# Patient Record
Sex: FEMALE | Race: WHITE | NOT HISPANIC OR LATINO | Employment: OTHER | URBAN - NONMETROPOLITAN AREA
[De-identification: names, ages, dates, MRNs, and addresses within clinical notes are randomized per-mention and may not be internally consistent; named-entity substitution may affect disease eponyms.]

---

## 2017-02-24 ENCOUNTER — TRANSCRIBE ORDERS (OUTPATIENT)
Dept: LAB | Facility: MEDICAL CENTER | Age: 57
End: 2017-02-24

## 2017-02-24 ENCOUNTER — APPOINTMENT (OUTPATIENT)
Dept: LAB | Facility: MEDICAL CENTER | Age: 57
End: 2017-02-24
Payer: COMMERCIAL

## 2017-02-24 DIAGNOSIS — K75.81 NONALCOHOLIC STEATOHEPATITIS: ICD-10-CM

## 2017-02-24 DIAGNOSIS — K75.81 NONALCOHOLIC STEATOHEPATITIS: Primary | ICD-10-CM

## 2017-02-24 LAB
ALBUMIN SERPL BCP-MCNC: 3.6 G/DL (ref 3.5–5)
ALP SERPL-CCNC: 93 U/L (ref 46–116)
ALT SERPL W P-5'-P-CCNC: 100 U/L (ref 12–78)
ANION GAP SERPL CALCULATED.3IONS-SCNC: 6 MMOL/L (ref 4–13)
AST SERPL W P-5'-P-CCNC: 39 U/L (ref 5–45)
BASOPHILS # BLD AUTO: 0.02 THOUSANDS/ΜL (ref 0–0.1)
BASOPHILS NFR BLD AUTO: 0 % (ref 0–1)
BILIRUB SERPL-MCNC: 0.65 MG/DL (ref 0.2–1)
BUN SERPL-MCNC: 22 MG/DL (ref 5–25)
CALCIUM SERPL-MCNC: 9.5 MG/DL (ref 8.3–10.1)
CHLORIDE SERPL-SCNC: 106 MMOL/L (ref 100–108)
CO2 SERPL-SCNC: 28 MMOL/L (ref 21–32)
CREAT SERPL-MCNC: 0.83 MG/DL (ref 0.6–1.3)
EOSINOPHIL # BLD AUTO: 0.21 THOUSAND/ΜL (ref 0–0.61)
EOSINOPHIL NFR BLD AUTO: 3 % (ref 0–6)
ERYTHROCYTE [DISTWIDTH] IN BLOOD BY AUTOMATED COUNT: 13.6 % (ref 11.6–15.1)
GFR SERPL CREATININE-BSD FRML MDRD: >60 ML/MIN/1.73SQ M
GLUCOSE SERPL-MCNC: 85 MG/DL (ref 65–140)
HCT VFR BLD AUTO: 42.7 % (ref 34.8–46.1)
HGB BLD-MCNC: 13.6 G/DL (ref 11.5–15.4)
LYMPHOCYTES # BLD AUTO: 2.99 THOUSANDS/ΜL (ref 0.6–4.47)
LYMPHOCYTES NFR BLD AUTO: 36 % (ref 14–44)
MCH RBC QN AUTO: 29.2 PG (ref 26.8–34.3)
MCHC RBC AUTO-ENTMCNC: 31.9 G/DL (ref 31.4–37.4)
MCV RBC AUTO: 92 FL (ref 82–98)
MONOCYTES # BLD AUTO: 0.57 THOUSAND/ΜL (ref 0.17–1.22)
MONOCYTES NFR BLD AUTO: 7 % (ref 4–12)
NEUTROPHILS # BLD AUTO: 4.44 THOUSANDS/ΜL (ref 1.85–7.62)
NEUTS SEG NFR BLD AUTO: 54 % (ref 43–75)
NRBC BLD AUTO-RTO: 0 /100 WBCS
PLATELET # BLD AUTO: 286 THOUSANDS/UL (ref 149–390)
PMV BLD AUTO: 11.6 FL (ref 8.9–12.7)
POTASSIUM SERPL-SCNC: 4.1 MMOL/L (ref 3.5–5.3)
PROT SERPL-MCNC: 8 G/DL (ref 6.4–8.2)
RBC # BLD AUTO: 4.65 MILLION/UL (ref 3.81–5.12)
SODIUM SERPL-SCNC: 140 MMOL/L (ref 136–145)
WBC # BLD AUTO: 8.27 THOUSAND/UL (ref 4.31–10.16)

## 2017-02-24 PROCEDURE — 80053 COMPREHEN METABOLIC PANEL: CPT

## 2017-02-24 PROCEDURE — 36415 COLL VENOUS BLD VENIPUNCTURE: CPT

## 2017-02-24 PROCEDURE — 85025 COMPLETE CBC W/AUTO DIFF WBC: CPT

## 2017-07-20 ENCOUNTER — HOSPITAL ENCOUNTER (OUTPATIENT)
Dept: SLEEP CENTER | Facility: HOSPITAL | Age: 57
Discharge: HOME | End: 2017-07-20
Payer: COMMERCIAL

## 2017-07-24 ENCOUNTER — TRANSCRIBE ORDERS (OUTPATIENT)
Dept: SLEEP CENTER | Facility: HOSPITAL | Age: 57
End: 2017-07-24

## 2017-07-24 DIAGNOSIS — G47.33 OBSTRUCTIVE SLEEP APNEA (ADULT) (PEDIATRIC): Primary | ICD-10-CM

## 2018-06-12 RX ORDER — GABAPENTIN 400 MG/1
CAPSULE ORAL
Qty: 90 CAPSULE | Refills: 0 | OUTPATIENT
Start: 2018-06-12

## 2018-07-17 RX ORDER — VALSARTAN 320 MG/1
TABLET ORAL
COMMUNITY
Start: 2018-05-24

## 2018-07-17 RX ORDER — LEVOTHYROXINE SODIUM 0.1 MG/1
100 TABLET ORAL
COMMUNITY

## 2018-07-17 RX ORDER — ASPIRIN 81 MG/1
162 TABLET, CHEWABLE ORAL
COMMUNITY

## 2018-07-17 RX ORDER — PANTOPRAZOLE SODIUM 20 MG/1
20 TABLET, DELAYED RELEASE ORAL
COMMUNITY

## 2018-07-17 RX ORDER — GABAPENTIN 400 MG/1
400 CAPSULE ORAL
COMMUNITY
End: 2018-07-19

## 2018-07-17 RX ORDER — DULOXETIN HYDROCHLORIDE 30 MG/1
30 CAPSULE, DELAYED RELEASE ORAL
COMMUNITY
End: 2018-07-19 | Stop reason: ALTCHOICE

## 2018-07-17 RX ORDER — RALOXIFENE HYDROCHLORIDE 60 MG/1
60 TABLET, FILM COATED ORAL
COMMUNITY

## 2018-07-17 RX ORDER — LEVOTHYROXINE SODIUM 100 MCG
TABLET ORAL
COMMUNITY
Start: 2018-05-24 | End: 2018-07-17

## 2018-07-19 ENCOUNTER — OFFICE VISIT (OUTPATIENT)
Dept: SLEEP CENTER | Facility: CLINIC | Age: 58
End: 2018-07-19
Payer: COMMERCIAL

## 2018-07-19 VITALS
SYSTOLIC BLOOD PRESSURE: 124 MMHG | DIASTOLIC BLOOD PRESSURE: 84 MMHG | HEIGHT: 64 IN | BODY MASS INDEX: 38.41 KG/M2 | RESPIRATION RATE: 16 BRPM | HEART RATE: 87 BPM | WEIGHT: 225 LBS

## 2018-07-19 DIAGNOSIS — G25.81 RLS (RESTLESS LEGS SYNDROME): Primary | ICD-10-CM

## 2018-07-19 DIAGNOSIS — M79.7 FIBROMYALGIA SYNDROME: ICD-10-CM

## 2018-07-19 DIAGNOSIS — E66.9 OBESITY (BMI 30-39.9): ICD-10-CM

## 2018-07-19 DIAGNOSIS — G47.61 PLMD (PERIODIC LIMB MOVEMENT DISORDER): ICD-10-CM

## 2018-07-19 DIAGNOSIS — R06.83 SNORING: ICD-10-CM

## 2018-07-19 DIAGNOSIS — G47.00 INSOMNIA, UNSPECIFIED TYPE: ICD-10-CM

## 2018-07-19 PROCEDURE — 99214 OFFICE O/P EST MOD 30 MIN: CPT | Performed by: INTERNAL MEDICINE

## 2018-07-19 NOTE — PROGRESS NOTES
Follow-Up Note - [address] [place]  60 y o  female  TLO:5/[birth year]  CHARITO:[id number]    CC: I saw this patient for follow-up in clinic today for restless leg syndrome, Coexisting Sleep and Medical Problems  PFSH, Problem List, Medications & Allergies were reviewed in EMR  Interval changes: [none reported ]   She  has no past medical history on file  She has a current medication list which includes the following prescription(s): aspirin, calcium-vitamin d, cholecalciferol, levothyroxine, multiple vitamins-minerals, omega-3 fatty acids, pantoprazole, raloxifene, and valsartan  ROS: reviewed (see attached)  Ronnypriya Levinetong HPI:  She ran out of her medication and stopped taking it around 3 weeks ago  Since then, she feels she is doing much better off the medication  She continues to have restless leg symptoms but only infrequently and not disturbing sleep  She is not aware of jerking movements during sleep  She sleeps alone but is not aware of snoring or breathing difficulties during sleep  She was taking Cymbalta for fibromyalgia but discontinued use because symptoms have improved  Sleep Routine: She reports getting 8 hours sleep; she has no difficulty initiating or maintaining sleep   She awakens spontaneously feeling refreshed  She denies excessive drowsiness  [She rated herself at Total score: 7 /24 on the [place] sleepiness scale ]    Habits:[ has no tobacco history on file ], [ has no alcohol history on file ], [ has no drug history on file ], Caffeine use: limited , Exercise routine: none  EXAM: /84 (BP Location: Left arm, Patient Position: Sitting, Cuff Size: Large)   Pulse 87   Resp 16   Ht 5' 4" (1 626 m)   Wt 102 kg (225 lb)   BMI 38 62 kg/m²    Patient is alert, orientated, cooperative [and in no distress]  Mental state [appears normal]  Craniofacial anatomy obvious overjet  There are [no] facial pressure marks or rashes  There are no abnormal neck masses   Nasal airway is [patent ]  Mucous membranes appeared normal  The oral airway [is crowded ] Base of tongue is at Mallampati class IV (only hard palate visible)  [Apart from truncal obesity,] the rest of exam (Heart, Lungs, Abdomen, CNS and Musculoskeletal systems) was unremarkable   IMPRESSION:     1  RLS (restless legs syndrome)  Sleep F/U  - established patient   2  PLMD (periodic limb movement disorder)     3  Snoring     4  Insomnia, unspecified type      Resolved   5  Fibromyalgia syndrome      Improved   6  Obesity (BMI 30-39  9)         PLAN:  1  I reviewed results of diagnostic study in 2015 with her  2  I discussed treatment options with risks and benefits  3  She elected no medication for now  4  I advised on nonpharmacological strategies for restless legs syndrome  5  I advised on sleep hygiene, regular exercise and weight reduction  6  I have not scheduled any follow-up in Sleep Clinic at this time  I will be happy to see her in future as needed  Thank you for allowing me to participate in the care of this patient            Sincerely,    Authenticated electronically by Trena Freeman MD on 62/92/78   Board Certified Specialist

## 2018-07-19 NOTE — PROGRESS NOTES
Review of Systems      Genitourinary post menopausal (no peroids)   Cardiology none   Gastrointestinal none   Neurology none   Constitutional none   Integumentary none   Psychiatry none   Musculoskeletal joint pain, back pain and legs twitching/jerking   Pulmonary none   ENT throat clearing   Endocrine none   Hematological none

## 2019-06-05 ENCOUNTER — APPOINTMENT (OUTPATIENT)
Dept: RADIOLOGY | Facility: CLINIC | Age: 59
End: 2019-06-05
Payer: COMMERCIAL

## 2019-06-05 ENCOUNTER — TRANSCRIBE ORDERS (OUTPATIENT)
Dept: URGENT CARE | Facility: CLINIC | Age: 59
End: 2019-06-05

## 2019-06-05 DIAGNOSIS — M25.562 LEFT KNEE PAIN, UNSPECIFIED CHRONICITY: Primary | ICD-10-CM

## 2019-06-05 PROCEDURE — 73562 X-RAY EXAM OF KNEE 3: CPT

## 2021-01-13 DIAGNOSIS — R06.00 DYSPNEA ON EXERTION: ICD-10-CM

## 2021-01-13 DIAGNOSIS — Z20.828 EXPOSURE TO SARS-ASSOCIATED CORONAVIRUS: ICD-10-CM

## 2021-01-13 DIAGNOSIS — R68.83 CHILLS: ICD-10-CM

## 2021-01-13 DIAGNOSIS — R05.9 COUGH: ICD-10-CM

## 2021-01-13 PROCEDURE — U0005 INFEC AGEN DETEC AMPLI PROBE: HCPCS

## 2021-01-13 PROCEDURE — U0003 INFECTIOUS AGENT DETECTION BY NUCLEIC ACID (DNA OR RNA); SEVERE ACUTE RESPIRATORY SYNDROME CORONAVIRUS 2 (SARS-COV-2) (CORONAVIRUS DISEASE [COVID-19]), AMPLIFIED PROBE TECHNIQUE, MAKING USE OF HIGH THROUGHPUT TECHNOLOGIES AS DESCRIBED BY CMS-2020-01-R: HCPCS

## 2021-01-14 LAB — SARS-COV-2 RNA RESP QL NAA+PROBE: POSITIVE

## 2021-11-10 PROCEDURE — U0003 INFECTIOUS AGENT DETECTION BY NUCLEIC ACID (DNA OR RNA); SEVERE ACUTE RESPIRATORY SYNDROME CORONAVIRUS 2 (SARS-COV-2) (CORONAVIRUS DISEASE [COVID-19]), AMPLIFIED PROBE TECHNIQUE, MAKING USE OF HIGH THROUGHPUT TECHNOLOGIES AS DESCRIBED BY CMS-2020-01-R: HCPCS | Performed by: NURSE PRACTITIONER

## 2021-11-10 PROCEDURE — U0005 INFEC AGEN DETEC AMPLI PROBE: HCPCS | Performed by: NURSE PRACTITIONER

## 2022-02-24 ENCOUNTER — OFFICE VISIT (OUTPATIENT)
Dept: PAIN MEDICINE | Facility: CLINIC | Age: 62
End: 2022-02-24
Payer: COMMERCIAL

## 2022-02-24 VITALS
HEIGHT: 64 IN | HEART RATE: 93 BPM | BODY MASS INDEX: 40.46 KG/M2 | DIASTOLIC BLOOD PRESSURE: 86 MMHG | SYSTOLIC BLOOD PRESSURE: 136 MMHG | WEIGHT: 237 LBS

## 2022-02-24 DIAGNOSIS — M17.10 PRIMARY OSTEOARTHRITIS OF KNEE, UNSPECIFIED LATERALITY: ICD-10-CM

## 2022-02-24 DIAGNOSIS — M46.1 SACROILIITIS (HCC): Primary | ICD-10-CM

## 2022-02-24 PROCEDURE — 99204 OFFICE O/P NEW MOD 45 MIN: CPT | Performed by: ANESTHESIOLOGY

## 2022-02-24 RX ORDER — TELMISARTAN 40 MG/1
TABLET ORAL
COMMUNITY

## 2022-02-24 RX ORDER — METOCLOPRAMIDE 5 MG/1
TABLET ORAL
COMMUNITY
Start: 2022-01-14

## 2022-02-24 NOTE — PATIENT INSTRUCTIONS

## 2022-02-24 NOTE — PROGRESS NOTES
Assessment:  1  Sacroiliitis ([facility] [address] )    2  Primary osteoarthritis of knee, unspecified laterality        Plan:  Patient is a 70-year-old female with complaints of hip pain and pain with chronic pain syndrome secondary to primary osteoarthritis of the knee and sacroiliitis presents office for initial consultation  Patient was managed by Dr Bora Pérez with performed intra-articular knee injections and sacroiliac joint steroid injections with significant alleviation patient's symptoms  1  Patient will follow-up as needed she was instructed for us to schedule her for injection     History of Present Illness: The patient is a 57 y o  female who presents for consultation in regards to Back Pain and Knee Pain  Symptoms have been present for 40 years  Symptoms began following a non work related injury  Pain is reported to be 5 on the numeric rating scale  Symptoms are felt nearly constantly and worst in the no typical pattern  Symptoms are characterized as burning, dull/aching and throbbing  Symptoms are associated with left leg weakness  Aggravating factors include kneeling, standing, bending, leaning forward and leaning bckward  Relieving factors include sitting, walking and relaxation  No change in symptoms with lying down, exercise, turning the head, coughing/sneezing and bowel movements  Treatments that have been helpful include prior injections including Left knee steroid injection and right sacroiliac joint steroid, physical therapy, chiropractic manipulation, home exercise, TENS unit and heat/ice  Medications to relieve symptoms include none  Review of Systems:    Review of Systems   Musculoskeletal: Positive for arthralgias and back pain  All other systems reviewed and are negative  No past medical history on file  No past surgical history on file  No family history on file      Social History     Occupational History    Not on file   Tobacco Use    Smoking status: Not on file    Smokeless tobacco: Not on file   Substance and Sexual Activity    Alcohol use: Not on file    Drug use: Not on file    Sexual activity: Not on file         Current Outpatient Medications:     aspirin 81 mg chewable tablet, Chew 162 mg, Disp: , Rfl:     calcium-vitamin D 250-100 MG-UNIT per tablet, Take 1 tablet by mouth, Disp: , Rfl:     CHOLECALCIFEROL PO, Take 1,000 Units by mouth, Disp: , Rfl:     levothyroxine 100 mcg tablet, Take 100 mcg by mouth, Disp: , Rfl:     Multiple Vitamins-Minerals (MULTIVITAMIN ADULT PO), Take 1 capsule by mouth, Disp: , Rfl:     Omega-3 Fatty Acids (FISH OIL PO), Take 1 g by mouth, Disp: , Rfl:     pantoprazole (PROTONIX) 20 mg tablet, Take 20 mg by mouth, Disp: , Rfl:     raloxifene (EVISTA) 60 mg tablet, Take 60 mg by mouth, Disp: , Rfl:     valsartan (DIOVAN) 320 MG tablet, , Disp: , Rfl:     Allergies   Allergen Reactions    Penicillins Hives and Hypertension    Sulfa Antibiotics Hives and Hypertension       Physical Exam:    /86   Pulse 93   Ht 5' 4" (1 626 m)   Wt 108 kg (237 lb)   BMI 40 68 kg/m²     Constitutional: normal, well developed, well nourished, alert, in no distress and non-toxic and no overt pain behavior  and obese  Eyes: anicteric  HEENT: grossly intact  Neck: supple, symmetric, trachea midline and no masses   Pulmonary:even and unlabored  Cardiovascular:No edema or pitting edema present  Skin:Normal without rashes or lesions and well hydrated  Psychiatric:Mood and affect appropriate  Neurologic:Cranial Nerves II-XII grossly intact  Musculoskeletal:normal     Lumbar/Sacral Spine examination demonstrates  Full range of motion lumbar spine with pain upon: flexion, lateral rotation to the left/right, and bending to the left/right  Bilateral lumbar paraspinals tender to palpation  Muscle spasms noted in the lumbar area bilaterally  4/5 lower extremity strength in all muscle groups bilaterally   Positive seated straight leg raise for bilateral lower extremities  Sensitivity to light touch intact bilateral lower extremities  2+ reflexes in the patella and Achilles  No ankle clonus     Imaging  No orders to display       No orders of the defined types were placed in this encounter

## 2022-04-25 ENCOUNTER — OFFICE VISIT (OUTPATIENT)
Dept: PAIN MEDICINE | Facility: CLINIC | Age: 62
End: 2022-04-25
Payer: COMMERCIAL

## 2022-04-25 VITALS
DIASTOLIC BLOOD PRESSURE: 86 MMHG | HEIGHT: 64 IN | WEIGHT: 239 LBS | BODY MASS INDEX: 40.8 KG/M2 | HEART RATE: 94 BPM | SYSTOLIC BLOOD PRESSURE: 155 MMHG

## 2022-04-25 DIAGNOSIS — G89.4 CHRONIC PAIN SYNDROME: Primary | ICD-10-CM

## 2022-04-25 DIAGNOSIS — M17.0 PRIMARY OSTEOARTHRITIS OF BOTH KNEES: ICD-10-CM

## 2022-04-25 DIAGNOSIS — M46.1 SACROILIITIS (HCC): ICD-10-CM

## 2022-04-25 PROCEDURE — 99214 OFFICE O/P EST MOD 30 MIN: CPT | Performed by: NURSE PRACTITIONER

## 2022-04-25 NOTE — PROGRESS NOTES
Assessment:  1  Chronic pain syndrome    2  Primary osteoarthritis of both knees    3  Sacroiliitis ([address] )        Plan:  While the patient was in the office today, I did have a thorough conversation regarding their chronic pain syndrome, medication management, and treatment plan options  Patient is being seen for follow-up visit  She was initially seen here for consultation on 02/24/2022  She was a previous patient of Dr Neo Kasper before his detention  She underwent knee joint injections and sacroiliac joint injections in the past with good results  Will consider repeating injections in the future if her pain worsens  Trial diclofenac gel 1%, she can apply 2 g to each knee up to 4 times daily if needed for pain  A prescription was sent to her pharmacy with refills  Continue over-the-counter Tylenol or Aleve as needed  I discussed with the patient that at this point time she can follow up with our office on an as-needed basis  I did review the patient that if her pain symptoms should change, worsen, and/or if she would experience any new symptoms as she would like to be evaluated for, she should give our office a call  The patient was agreeable and verbalized an understanding  History of Present Illness: The patient is a 57 y o  female who presents for a follow up office visit in regards to Leg Pain, Knee Pain, and Foot Pain  The patients current symptoms include complaints of bilateral knee pain, usually the left is worse than the right, low back pain  Current pain level is a 5/10  Quality pain is described as burning, dull, aching  Current pain medications includes:  Over-the-counter Aleve or Tylenol if needed    The patient reports that this regimen is providing 30 % pain relief  The patient is reporting no side effects from this pain medication regimen      I have personally reviewed and/or updated the patient's past medical history, past surgical history, family history, social history, current medications, allergies, and vital signs today  Review of Systems  Review of Systems   Constitutional: Negative for chills and fever  HENT: Negative for ear pain and sore throat  Eyes: Negative for pain and visual disturbance  Respiratory: Negative for cough and shortness of breath  Cardiovascular: Negative for chest pain and palpitations  Gastrointestinal: Negative for abdominal pain and vomiting  Genitourinary: Negative for dysuria and hematuria  Musculoskeletal: Positive for joint swelling  Negative for arthralgias and back pain  Joint stiffness     Skin: Negative for color change and rash  Neurological: Negative for seizures and syncope  All other systems reviewed and are negative  History reviewed  No pertinent past medical history  History reviewed  No pertinent surgical history  History reviewed  No pertinent family history  Social History     Occupational History    Not on file   Tobacco Use    Smoking status: Never Smoker    Smokeless tobacco: Never Used   Vaping Use    Vaping Use: Never used   Substance and Sexual Activity    Alcohol use:  Yes    Drug use: Never    Sexual activity: Not on file         Current Outpatient Medications:     aspirin 81 mg chewable tablet, Chew 162 mg, Disp: , Rfl:     calcium-vitamin D 250-100 MG-UNIT per tablet, Take 1 tablet by mouth, Disp: , Rfl:     CHOLECALCIFEROL PO, Take 1,000 Units by mouth, Disp: , Rfl:     levothyroxine 100 mcg tablet, Take 100 mcg by mouth, Disp: , Rfl:     metoclopramide (REGLAN) 5 mg tablet, , Disp: , Rfl:     Multiple Vitamins-Minerals (MULTIVITAMIN ADULT PO), Take 1 capsule by mouth, Disp: , Rfl:     Omega-3 Fatty Acids (FISH OIL PO), Take 1 g by mouth, Disp: , Rfl:     pantoprazole (PROTONIX) 20 mg tablet, Take 20 mg by mouth, Disp: , Rfl:     raloxifene (EVISTA) 60 mg tablet, Take 60 mg by mouth, Disp: , Rfl:     telmisartan (MICARDIS) 40 mg tablet, telmisartan 40 mg tablet, Disp: , Rfl:     Diclofenac Sodium (VOLTAREN) 1 %, Apply 4 g topically 4 (four) times a day, Disp: 100 g, Rfl: 2    valsartan (DIOVAN) 320 MG tablet, , Disp: , Rfl:     Allergies   Allergen Reactions    Penicillins Hives and Hypertension    Sulfa Antibiotics Hives and Hypertension       Physical Exam:    /86   Pulse 94   Ht 5' 4" (1 626 m)   Wt 108 kg (239 lb)   BMI 41 02 kg/m²     Constitutional:normal, well developed, well nourished, alert, in no distress and non-toxic and no overt pain behavior  Eyes:anicteric  HEENT:grossly intact  Neck:supple, symmetric, trachea midline and no masses   Pulmonary:even and unlabored  Cardiovascular:No edema or pitting edema present  Skin:Normal without rashes or lesions and well hydrated  Psychiatric:Mood and affect appropriate  Neurologic:Cranial Nerves II-XII grossly intact  Musculoskeletal:normal    Imaging  No orders to display       No orders of the defined types were placed in this encounter

## 2023-01-25 ENCOUNTER — ANESTHESIA EVENT (OUTPATIENT)
Dept: PERIOP | Facility: HOSPITAL | Age: 63
End: 2023-01-25

## 2023-01-27 RX ORDER — VIT C/B6/B5/MAGNESIUM/HERB 173 50-5-6-5MG
CAPSULE ORAL
COMMUNITY

## 2023-01-27 RX ORDER — FOLIC ACID 1 MG/1
TABLET ORAL DAILY
COMMUNITY

## 2023-01-27 RX ORDER — VITAMIN E 268 MG
400 CAPSULE ORAL DAILY
COMMUNITY

## 2023-01-27 RX ORDER — SACCHAROMYCES BOULARDII 250 MG
250 CAPSULE ORAL 2 TIMES DAILY
COMMUNITY

## 2023-01-27 NOTE — PRE-PROCEDURE INSTRUCTIONS
Pre-Surgery Instructions:   Medication Instructions   • aspirin 81 mg chewable tablet Stop taking 7 days prior to surgery  • calcium-vitamin D 250-100 MG-UNIT per tablet Stop taking 7 days prior to surgery  • CHOLECALCIFEROL PO Stop taking 7 days prior to surgery  • Diclofenac Sodium (VOLTAREN) 1 % Stop taking 7 days prior to surgery  • ELDERBERRY PO Stop taking 7 days prior to surgery  • folic acid (FOLVITE) 1 mg tablet Stop taking 7 days prior to surgery  • levothyroxine 100 mcg tablet Take day of surgery  • metoclopramide (REGLAN) 5 mg tablet Take day of surgery  • Multiple Vitamins-Minerals (MULTIVITAMIN ADULT PO) Stop taking 7 days prior to surgery  • Omega-3 Fatty Acids (FISH OIL PO) Stop taking 7 days prior to surgery  • pantoprazole (PROTONIX) 20 mg tablet Take day of surgery  • raloxifene (EVISTA) 60 mg tablet Take day of surgery  • saccharomyces boulardii (FLORASTOR) 250 mg capsule Stop taking 7 days prior to surgery  • telmisartan (MICARDIS) 40 mg tablet Hold day of surgery  • Turmeric 500 MG CAPS Stop taking 7 days prior to surgery  • vitamin E, tocopherol, 400 units capsule Stop taking 7 days prior to surgery  You will receive a phone call from hospital for arrival time day before the procedure between 2-8pm  Please call surgeons office if any changes in your condition  Wear easy on/off clothing; consider type of surgery  Valuables, jewelry, piercings please keep at home  No contact lenses  Reviewed COVID protocol and masking policy  Reviewed NPO after MN except medications the morning of with a small sip of water  Follow pre surgery showering or cleaning instructions as  Reviewed by nurse or surgeons office      Questions answered and concerns addressed

## 2023-02-02 ENCOUNTER — ANESTHESIA (OUTPATIENT)
Dept: PERIOP | Facility: HOSPITAL | Age: 63
End: 2023-02-02

## 2023-02-02 ENCOUNTER — HOSPITAL ENCOUNTER (OUTPATIENT)
Facility: HOSPITAL | Age: 63
Setting detail: OUTPATIENT SURGERY
Discharge: HOME | End: 2023-02-02
Attending: OBSTETRICS & GYNECOLOGY | Admitting: OBSTETRICS & GYNECOLOGY

## 2023-02-02 VITALS
BODY MASS INDEX: 40.05 KG/M2 | WEIGHT: 234.57 LBS | HEIGHT: 64 IN | HEART RATE: 97 BPM | TEMPERATURE: 98.1 F | OXYGEN SATURATION: 96 % | SYSTOLIC BLOOD PRESSURE: 159 MMHG | DIASTOLIC BLOOD PRESSURE: 77 MMHG | RESPIRATION RATE: 16 BRPM

## 2023-02-02 DIAGNOSIS — N84.1 POLYP OF CERVIX UTERI: ICD-10-CM

## 2023-02-02 DIAGNOSIS — R93.89 ABNORMAL FINDINGS ON DIAGNOSTIC IMAGING OF OTHER SPECIFIED BODY STRUCTURES: ICD-10-CM

## 2023-02-02 PROBLEM — Z98.890 S/P DILATION AND CURETTAGE: Status: ACTIVE | Noted: 2023-02-02

## 2023-02-02 LAB
ATRIAL RATE: 77 BPM
P AXIS: 63 DEGREES
PR INTERVAL: 172 MS
QRS AXIS: 24 DEGREES
QRSD INTERVAL: 88 MS
QT INTERVAL: 408 MS
QTC INTERVAL: 461 MS
T WAVE AXIS: 37 DEGREES
VENTRICULAR RATE: 77 BPM

## 2023-02-02 RX ORDER — DEXAMETHASONE SODIUM PHOSPHATE 10 MG/ML
INJECTION, SOLUTION INTRAMUSCULAR; INTRAVENOUS AS NEEDED
Status: DISCONTINUED | OUTPATIENT
Start: 2023-02-02 | End: 2023-02-02

## 2023-02-02 RX ORDER — KETOROLAC TROMETHAMINE 30 MG/ML
INJECTION, SOLUTION INTRAMUSCULAR; INTRAVENOUS AS NEEDED
Status: DISCONTINUED | OUTPATIENT
Start: 2023-02-02 | End: 2023-02-02

## 2023-02-02 RX ORDER — BUPIVACAINE HYDROCHLORIDE 5 MG/ML
INJECTION, SOLUTION EPIDURAL; INTRACAUDAL AS NEEDED
Status: DISCONTINUED | OUTPATIENT
Start: 2023-02-02 | End: 2023-02-02 | Stop reason: HOSPADM

## 2023-02-02 RX ORDER — MIDAZOLAM HYDROCHLORIDE 2 MG/2ML
INJECTION, SOLUTION INTRAMUSCULAR; INTRAVENOUS AS NEEDED
Status: DISCONTINUED | OUTPATIENT
Start: 2023-02-02 | End: 2023-02-02

## 2023-02-02 RX ORDER — ONDANSETRON 2 MG/ML
INJECTION INTRAMUSCULAR; INTRAVENOUS AS NEEDED
Status: DISCONTINUED | OUTPATIENT
Start: 2023-02-02 | End: 2023-02-02

## 2023-02-02 RX ORDER — ONDANSETRON 2 MG/ML
4 INJECTION INTRAMUSCULAR; INTRAVENOUS ONCE AS NEEDED
Status: DISCONTINUED | OUTPATIENT
Start: 2023-02-02 | End: 2023-02-02 | Stop reason: HOSPADM

## 2023-02-02 RX ORDER — ACETAMINOPHEN 325 MG/1
975 TABLET ORAL EVERY 6 HOURS PRN
Status: DISCONTINUED | OUTPATIENT
Start: 2023-02-02 | End: 2023-02-02 | Stop reason: HOSPADM

## 2023-02-02 RX ORDER — GINSENG 100 MG
CAPSULE ORAL AS NEEDED
Status: DISCONTINUED | OUTPATIENT
Start: 2023-02-02 | End: 2023-02-02 | Stop reason: HOSPADM

## 2023-02-02 RX ORDER — PROPOFOL 10 MG/ML
INJECTION, EMULSION INTRAVENOUS AS NEEDED
Status: DISCONTINUED | OUTPATIENT
Start: 2023-02-02 | End: 2023-02-02

## 2023-02-02 RX ORDER — PROPOFOL 10 MG/ML
INJECTION, EMULSION INTRAVENOUS CONTINUOUS PRN
Status: DISCONTINUED | OUTPATIENT
Start: 2023-02-02 | End: 2023-02-02

## 2023-02-02 RX ORDER — LIDOCAINE HYDROCHLORIDE 20 MG/ML
INJECTION, SOLUTION EPIDURAL; INFILTRATION; INTRACAUDAL; PERINEURAL AS NEEDED
Status: DISCONTINUED | OUTPATIENT
Start: 2023-02-02 | End: 2023-02-02

## 2023-02-02 RX ORDER — SODIUM CHLORIDE 9 MG/ML
125 INJECTION, SOLUTION INTRAVENOUS CONTINUOUS
Status: DISCONTINUED | OUTPATIENT
Start: 2023-02-02 | End: 2023-02-02 | Stop reason: HOSPADM

## 2023-02-02 RX ORDER — ONDANSETRON 2 MG/ML
4 INJECTION INTRAMUSCULAR; INTRAVENOUS EVERY 6 HOURS PRN
Status: DISCONTINUED | OUTPATIENT
Start: 2023-02-02 | End: 2023-02-02 | Stop reason: HOSPADM

## 2023-02-02 RX ORDER — ESMOLOL HYDROCHLORIDE 10 MG/ML
INJECTION INTRAVENOUS AS NEEDED
Status: DISCONTINUED | OUTPATIENT
Start: 2023-02-02 | End: 2023-02-02

## 2023-02-02 RX ORDER — FENTANYL CITRATE 50 UG/ML
INJECTION, SOLUTION INTRAMUSCULAR; INTRAVENOUS AS NEEDED
Status: DISCONTINUED | OUTPATIENT
Start: 2023-02-02 | End: 2023-02-02

## 2023-02-02 RX ORDER — HYDROMORPHONE HCL/PF 1 MG/ML
0.5 SYRINGE (ML) INJECTION
Status: DISCONTINUED | OUTPATIENT
Start: 2023-02-02 | End: 2023-02-02 | Stop reason: HOSPADM

## 2023-02-02 RX ORDER — FENTANYL CITRATE/PF 50 MCG/ML
50 SYRINGE (ML) INJECTION
Status: DISCONTINUED | OUTPATIENT
Start: 2023-02-02 | End: 2023-02-02 | Stop reason: HOSPADM

## 2023-02-02 RX ORDER — IBUPROFEN 600 MG/1
600 TABLET ORAL EVERY 6 HOURS PRN
Status: DISCONTINUED | OUTPATIENT
Start: 2023-02-02 | End: 2023-02-02 | Stop reason: HOSPADM

## 2023-02-02 RX ORDER — MAGNESIUM HYDROXIDE 1200 MG/15ML
LIQUID ORAL AS NEEDED
Status: DISCONTINUED | OUTPATIENT
Start: 2023-02-02 | End: 2023-02-02 | Stop reason: HOSPADM

## 2023-02-02 RX ADMIN — FENTANYL CITRATE 50 MCG: 50 INJECTION INTRAMUSCULAR; INTRAVENOUS at 09:37

## 2023-02-02 RX ADMIN — LIDOCAINE HYDROCHLORIDE 60 MG: 20 INJECTION, SOLUTION EPIDURAL; INFILTRATION; INTRACAUDAL; PERINEURAL at 09:23

## 2023-02-02 RX ADMIN — DEXAMETHASONE SODIUM PHOSPHATE 10 MG: 10 INJECTION INTRAMUSCULAR; INTRAVENOUS at 09:28

## 2023-02-02 RX ADMIN — MIDAZOLAM 1 MG: 1 INJECTION INTRAMUSCULAR; INTRAVENOUS at 09:15

## 2023-02-02 RX ADMIN — SODIUM CHLORIDE 125 ML/HR: 0.9 INJECTION, SOLUTION INTRAVENOUS at 07:58

## 2023-02-02 RX ADMIN — ONDANSETRON 4 MG: 2 INJECTION INTRAMUSCULAR; INTRAVENOUS at 09:53

## 2023-02-02 RX ADMIN — FENTANYL CITRATE 50 MCG: 50 INJECTION INTRAMUSCULAR; INTRAVENOUS at 09:45

## 2023-02-02 RX ADMIN — FENTANYL CITRATE 50 MCG: 50 INJECTION INTRAMUSCULAR; INTRAVENOUS at 09:27

## 2023-02-02 RX ADMIN — PROPOFOL 50 MG: 10 INJECTION, EMULSION INTRAVENOUS at 09:37

## 2023-02-02 RX ADMIN — ESMOLOL HYDROCHLORIDE 20 MG: 100 INJECTION, SOLUTION INTRAVENOUS at 09:58

## 2023-02-02 RX ADMIN — KETOROLAC TROMETHAMINE 30 MG: 30 INJECTION, SOLUTION INTRAMUSCULAR at 09:53

## 2023-02-02 RX ADMIN — FENTANYL CITRATE 50 MCG: 50 INJECTION INTRAMUSCULAR; INTRAVENOUS at 09:32

## 2023-02-02 RX ADMIN — PROPOFOL 110 MCG/KG/MIN: 10 INJECTION, EMULSION INTRAVENOUS at 09:25

## 2023-02-02 RX ADMIN — PROPOFOL 50 MG: 10 INJECTION, EMULSION INTRAVENOUS at 09:24

## 2023-02-02 RX ADMIN — PROPOFOL 150 MG: 10 INJECTION, EMULSION INTRAVENOUS at 09:23

## 2023-02-02 NOTE — INTERVAL H&P NOTE
H&P reviewed  After examining the patient I find no changes in the patients condition since the H&P had been written      Vitals:    02/02/23 0827   BP: 145/68   Pulse:    Resp:    Temp:    SpO2:

## 2023-02-02 NOTE — ANESTHESIA PREPROCEDURE EVALUATION
Procedure:  (D&C) W/ HYSTEROSCOPY (Uterus)    Relevant Problems   ANESTHESIA   (+) PONV (postoperative nausea and vomiting)      MUSCULOSKELETAL   (+) Fibromyalgia syndrome   (+) Primary osteoarthritis of knee   (+) Sacroiliitis (HCC)      NEURO/PSYCH   (+) Chronic pain syndrome   (+) Fibromyalgia syndrome        Physical Exam    Airway    Mallampati score: II         Dental       Cardiovascular  Rhythm: regular, Rate: normal,     Pulmonary  Breath sounds clear to auscultation,     Other Findings        Anesthesia Plan  ASA Score- 2     Anesthesia Type-     Plan Factors-Exercise tolerance (METS): >4 METS  Chart reviewed  Existing labs reviewed  Patient summary reviewed  Patient is not a current smoker  Patient not instructed to abstain from smoking on day of procedure  Patient did not smoke on day of surgery  Obstructive sleep apnea risk education given perioperatively  Induction- intravenous  Postoperative Plan-     Informed Consent- Anesthetic plan and risks discussed with patient

## 2023-02-02 NOTE — OP NOTE
OPERATIVE REPORT  PATIENT NAME: Keturah Sacks    :  1960  MRN: [id number]  Pt Location: [facility] OR ROOM 05    SURGERY DATE: 2023    Surgeon(s) and Role:     Kalli Simms DO - Primary     * Jose Alfredo Diehl MD - Assisting    Preop Diagnosis:  Abnormal findings on diagnostic imaging of other specified body structures [R93 89]  Polyp of cervix uteri [N84 1]    Post-Op Diagnosis Codes:     * Abnormal findings on diagnostic imaging of other specified body structures [R93 89]     * Polyp of cervix uteri [N84 1]   Vulvar atrophy  Vaginal atrophy    Procedure(s):  (D&C) W/ HYSTEROSCOPY;  vulvar biopsy x 2    Specimen(s):  ID Type Source Tests Collected by Time Destination   1 : endometrial curettings  Tissue Endometrium TISSUE EXAM Jose Ocampo, DO 2023 8096    2 : left perineum  Tissue Perineum TISSUE EXAM Martha Polanco, DO 2023 0957    3 : left lui-clitoral  Tissue Clitoris TISSUE EXAM Jose Ocampo, DO 2023 0957        Estimated Blood Loss:   Minimal    Drains:  None    Anesthesia Type:   General LMA    Operative Indications:  Abnormal findings on diagnostic imaging of other specified body structures [R93 89]  Polyp of cervix uteri [N84 1]  Vulvar/vaginal atrophy    Complications:    None apparent    Operative Findings:   1  Bimanual exam revealed small, anteverted uterus with normal contours and freely mobile  No adnexal masses palpated bilaterally  2  Vagina and vulva were atrophic with very narrow introitus  Vulvar tissue appears pale and lichenified  3  Cervix grossly normal in appearance without any lacerations or lesions  4  Uterus sounded to 6 cm   5  Hysteroscopic examination revealed scant, atrophic endometrial lining  Bilateral ostia were visualized  The patient was taken to the operating room  General LMA anesthesia (LMA) was administered    Sequential compression devices were placed, and the patient was positioned on the OR table in the dorsal lithotomy position  All pressure points were padded, and a monica hugger was placed to maintain control of core body temperature  A bimanual exam was performed, and the uterus was anteverted and small in size and consistency with no palpable uterine or adnexal masses  The patient was prepped and draped in the usual sterile fashion using chlorhexidine  A time out was performed to confirm correct patient and procedure  A straight catheter was introduced into the bladder, which was drained of 100 mL of clear yellow urine  A small Shaina speculum was used to carefully visualize the anterior lip of the cervix, which was then grasped with a single toothed tenaculum  The uterus was sounded to 6 cm  The cervix was serially dilated to 16F using Hanks dilators for introduction of the hysteroscope  Hysteroscope was introduced under direct visualization using normal saline solution as the distention media  The hysteroscope was advanced to the uterine fundus, and the entire uterine cavity was inspected in a systematic manner  There was noted to be the above findings  The hysteroscope was withdrawn  Sharp curetting was performed, starting at the 12'oclock position and rotating a total of 360 degrees to cover all surfaces  Endometrial tissue was obtained and sent for pathology  The single toothed tenaculum was removed from the anterior lip of the cervix, and good hemostasis was confirmed  The Shaina speculum was removed from the vagina  0 5% Marcaine was infiltrated into the left perineal site  A #4 Vin Biopsy Punch was used to collect a tissue sample from the left perineum  Then 0 5% Marcaine was infiltrated into the left periclitoral site  A #4 Vin Biopsy Punch was used to collect a tissue sample from the left periclitoral site  3-0 Vicryl was used to close the biopsy sites with a single stitch  Bacitracin topical ointment was applied to both biopsy sites       At the conclusion of the procedure, all needle, sponge, and instrument counts were correct x2  Dr Dom Rock was present and participated in all key portions of the case  Patient Disposition:  PACU         SIGNATURE: Mely Schmidt MD  DATE: February 2, 2023  TIME: 10:22 AM      Attending Physician/Surgeon Statement  I was present for and participated in all key surgical aspects of this patient's care  I agree with the resident's documentation as stated above      Martha Polanco  02/02/23  1:05 PM

## 2023-02-02 NOTE — ANESTHESIA POSTPROCEDURE EVALUATION
Post-Op Assessment Note    CV Status:  Stable  Pain Score: 2    Pain management: adequate     Mental Status:  Alert and awake   Hydration Status:  Euvolemic   PONV Controlled:  Controlled   Airway Patency:  Patent   Two or more mitigation strategies used for obstructive sleep apnea   Post Op Vitals Reviewed: Yes      Staff: Anesthesiologist         No notable events documented      /63 (02/02/23 1040)    Temp 98 1 °F (36 7 °C) (02/02/23 1040)    Pulse 84 (02/02/23 1040)   Resp 17 (02/02/23 1040)    SpO2 99 % (02/02/23 1040)

## 2023-06-01 ENCOUNTER — OFFICE VISIT (OUTPATIENT)
Dept: PAIN MEDICINE | Facility: CLINIC | Age: 63
End: 2023-06-01

## 2023-06-01 VITALS
SYSTOLIC BLOOD PRESSURE: 128 MMHG | BODY MASS INDEX: 39.65 KG/M2 | WEIGHT: 231 LBS | DIASTOLIC BLOOD PRESSURE: 84 MMHG | HEART RATE: 108 BPM

## 2023-06-01 DIAGNOSIS — M17.0 PRIMARY OSTEOARTHRITIS OF BOTH KNEES: ICD-10-CM

## 2023-06-01 DIAGNOSIS — G89.4 CHRONIC PAIN SYNDROME: Primary | ICD-10-CM

## 2023-06-01 NOTE — PROGRESS NOTES
Assessment:  1  Chronic pain syndrome    2  Primary osteoarthritis of both knees        Plan:  While the patient was in the office today, I did have a thorough conversation regarding their chronic pain syndrome, medication management, and treatment plan options  Patient is being seen for a follow-up visit  She is complaining of increasing left knee pain over the last several weeks  There was no inciting event  She was a previous patient of Dr Abby Menchaca before his residential  She underwent knee joint injections and sacroiliac joint injections in the past with good results  As such, patient will be scheduled for ultrasound-guided left knee joint injection in the near future  Complete risks and benefits including bleeding, infection, tissue reaction, nerve injury and allergic reaction were discussed  The approach was demonstrated using models and literature was provided  Verbal and written consent was obtained  Continue to use diclofenac gel 1% as needed  Follow-up 1 month after the injection  History of Present Illness: The patient is a 58 y o  female who presents for a follow up office visit in regards to No chief complaint on file      The patient’s current symptoms include complaints of left knee pain  Current pain level is an 8/10  Quality pain is described as burning, throbbing  Current pain medications includes: Diclofenac gel 1% as needed  The patient reports that this regimen is providing 25% pain relief  The patient is reporting no side effects from this pain medication regimen  I have personally reviewed and/or updated the patient's past medical history, past surgical history, family history, social history, current medications, allergies, and vital signs today  Review of Systems  Review of Systems   Constitutional: Negative for fatigue and unexpected weight change  HENT: Negative for dental problem, ear pain, hearing loss and sneezing      Eyes: Negative for visual disturbance  Respiratory: Negative for cough and chest tightness  Cardiovascular: Negative for leg swelling  Gastrointestinal: Negative for anal bleeding  Endocrine: Negative for heat intolerance  Genitourinary: Negative for flank pain and genital sores  Musculoskeletal: Positive for gait problem  Joint stiffness  Pain in extremity: left knee bursa   Skin: Negative for wound  Allergic/Immunologic: Negative for immunocompromised state  Neurological: Negative for speech difficulty and light-headedness  Hematological: Negative for adenopathy  Psychiatric/Behavioral: Negative for confusion  The patient is not hyperactive  All other systems reviewed and are negative  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, uses proph abx for dental procedures   • Fatty liver 2008   • Gastroparesis    • GERD (gastroesophageal reflux disease)    • Hypertension    • Mitral valve prolapse 1990   • PONV (postoperative nausea and vomiting)        Past Surgical History:   Procedure Laterality Date   • CHOLECYSTECTOMY  2008   • FOOT SURGERY Right 1999    multiple surgeries   • KNEE CARTILAGE SURGERY Right 2004   • [place] HYSTEROSCOPY BX ENDOMETRIUM&/POLYPC W/WO D&C N/A 2/2/2023    Procedure: (D&C) W/ HYSTEROSCOPY, vulvar biopsy;  Surgeon: Martha Polanco DO;  Location: [facility];  Service: Gynecology   • SINUS ENDOSCOPY     • STRABISMUS SURGERY Bilateral 1996   • TEAR DUCT SURGERY Left 2013   • US GUIDED LIVER BIOPSY  9/21/2017   • US GUIDED LIVER BIOPSY  10/26/2015       History reviewed  No pertinent family history      Social History     Occupational History   • Not on file   Tobacco Use   • Smoking status: Never   • Smokeless tobacco: Never   Vaping Use   • Vaping Use: Never used   Substance and Sexual Activity   • Alcohol use: Yes     Comment: 1-2/week   • Drug use: Never   • Sexual activity: Not on file         Current Outpatient Medications:   •  aspirin 81 mg chewable tablet, Chew 162 mg, Disp: , Rfl:   •  calcium-vitamin D 250-100 MG-UNIT per tablet, Take 1 tablet by mouth, Disp: , Rfl:   •  CHOLECALCIFEROL PO, Take 1,000 Units by mouth, Disp: , Rfl:   •  Diclofenac Sodium (VOLTAREN) 1 %, Apply 4 g topically 4 (four) times a day, Disp: 100 g, Rfl: 2  •  ELDERBERRY PO, Take by mouth, Disp: , Rfl:   •  folic acid (FOLVITE) 1 mg tablet, Take by mouth daily, Disp: , Rfl:   •  levothyroxine 100 mcg tablet, Take 100 mcg by mouth, Disp: , Rfl:   •  metoclopramide (REGLAN) 5 mg tablet, , Disp: , Rfl:   •  Multiple Vitamins-Minerals (MULTIVITAMIN ADULT PO), Take 1 capsule by mouth, Disp: , Rfl:   •  Omega-3 Fatty Acids (FISH OIL PO), Take 1 g by mouth, Disp: , Rfl:   •  pantoprazole (PROTONIX) 20 mg tablet, Take 20 mg by mouth, Disp: , Rfl:   •  raloxifene (EVISTA) 60 mg tablet, Take 60 mg by mouth, Disp: , Rfl:   •  telmisartan (MICARDIS) 40 mg tablet, telmisartan 40 mg tablet, Disp: , Rfl:   •  Turmeric 500 MG CAPS, Take by mouth, Disp: , Rfl:   •  vitamin E, tocopherol, 400 units capsule, Take 400 Units by mouth daily, Disp: , Rfl:   •  saccharomyces boulardii (FLORASTOR) 250 mg capsule, Take 250 mg by mouth 2 (two) times a day, Disp: , Rfl:   •  valsartan (DIOVAN) 320 MG tablet, , Disp: , Rfl:     Allergies   Allergen Reactions   • Penicillins Hives and Hypertension   • Sulfa Antibiotics Hives and Hypertension       Physical Exam:    /84   Pulse (!) 108   Wt 105 kg (231 lb)   BMI 39 65 kg/m²     Constitutional:normal, well developed, well nourished, alert, in no distress and non-toxic and no overt pain behavior    Eyes:anicteric  HEENT:grossly intact  Neck:supple, symmetric, trachea midline and no masses   Pulmonary:even and unlabored  Cardiovascular:No edema or pitting edema present  Skin:Normal without rashes or lesions and well hydrated  Psychiatric:Mood and affect appropriate  Neurologic:Cranial Nerves II-XII grossly intact  Musculoskeletal:Tenderness over the medial joint line of the left knee  Imaging  No orders to display       No orders of the defined types were placed in this encounter

## 2023-06-03 ENCOUNTER — OFFICE VISIT (OUTPATIENT)
Dept: URGENT CARE | Facility: MEDICAL CENTER | Age: 63
End: 2023-06-03

## 2023-06-03 VITALS
DIASTOLIC BLOOD PRESSURE: 82 MMHG | OXYGEN SATURATION: 95 % | TEMPERATURE: 97.4 F | SYSTOLIC BLOOD PRESSURE: 130 MMHG | HEART RATE: 92 BPM | BODY MASS INDEX: 39.58 KG/M2 | RESPIRATION RATE: 20 BRPM | WEIGHT: 230.6 LBS

## 2023-06-03 DIAGNOSIS — J01.90 ACUTE SINUSITIS, RECURRENCE NOT SPECIFIED, UNSPECIFIED LOCATION: Primary | ICD-10-CM

## 2023-06-03 DIAGNOSIS — J30.2 SEASONAL ALLERGIES: ICD-10-CM

## 2023-06-03 RX ORDER — LORATADINE 10 MG/1
10 TABLET ORAL DAILY
Qty: 30 TABLET | Refills: 0 | Status: SHIPPED | OUTPATIENT
Start: 2023-06-03 | End: 2023-07-03

## 2023-06-03 RX ORDER — PREDNISONE 50 MG/1
50 TABLET ORAL DAILY
Qty: 5 TABLET | Refills: 0 | Status: SHIPPED | OUTPATIENT
Start: 2023-06-03 | End: 2023-06-08

## 2023-06-03 NOTE — PROGRESS NOTES
[facility]        NAME: Marissa Rocha is a 58 y o  female  : 1960    MRN: [id number]  DATE: Neelima 3, 2023  TIME: 4:02 PM    Assessment and Plan   Acute sinusitis, recurrence not specified, unspecified location [J01 90]  1  Acute sinusitis, recurrence not specified, unspecified location  loratadine (CLARITIN) 10 mg tablet    predniSONE 50 mg tablet      2  Seasonal allergies  loratadine (CLARITIN) 10 mg tablet    predniSONE 50 mg tablet            Patient Instructions       Follow up with PCP in 3-5 days  Proceed to  ER if symptoms worsen  Chief Complaint     Chief Complaint   Patient presents with   • Cold Like Symptoms     Started on Tuesday with productive cough and congestion  Using OTC meds  History of Present Illness       5 day hx of sore throat , pnd, dry occ hacking cough wit yellow sputum and nasal discharge  Denies any fever, n/v/d or ear pain  No tob + seasoanl allergies and no meds taken  Has been using otc benadryl and mucinex with some relief  Review of Systems   Review of Systems   Constitutional: Negative  Negative for chills, fatigue and fever  HENT: Positive for postnasal drip, rhinorrhea, sinus pressure, sneezing and sore throat  Negative for congestion, ear discharge, facial swelling and trouble swallowing  Eyes: Negative  Negative for discharge and redness  Respiratory: Positive for cough  Negative for choking, shortness of breath and wheezing  Cardiovascular: Negative  Negative for chest pain  Gastrointestinal: Negative  Negative for abdominal pain  Endocrine: Negative  Genitourinary: Negative  Negative for dysuria  Musculoskeletal: Negative  Negative for gait problem  Skin: Negative  Negative for color change  Allergic/Immunologic: Positive for environmental allergies  Neurological: Negative  Negative for weakness and headaches  Psychiatric/Behavioral: Negative  Negative for behavioral problems           Current Medications       Current Outpatient Medications:   •  aspirin 81 mg chewable tablet, Chew 162 mg, Disp: , Rfl:   •  calcium-vitamin D 250-100 MG-UNIT per tablet, Take 1 tablet by mouth, Disp: , Rfl:   •  CHOLECALCIFEROL PO, Take 1,000 Units by mouth, Disp: , Rfl:   •  Diclofenac Sodium (VOLTAREN) 1 %, Apply 4 g topically 4 (four) times a day, Disp: 100 g, Rfl: 2  •  ELDERBERRY PO, Take by mouth, Disp: , Rfl:   •  folic acid (FOLVITE) 1 mg tablet, Take by mouth daily, Disp: , Rfl:   •  levothyroxine 100 mcg tablet, Take 100 mcg by mouth, Disp: , Rfl:   •  loratadine (CLARITIN) 10 mg tablet, Take 1 tablet (10 mg total) by mouth daily, Disp: 30 tablet, Rfl: 0  •  metoclopramide (REGLAN) 5 mg tablet, , Disp: , Rfl:   •  Multiple Vitamins-Minerals (MULTIVITAMIN ADULT PO), Take 1 capsule by mouth, Disp: , Rfl:   •  Omega-3 Fatty Acids (FISH OIL PO), Take 1 g by mouth, Disp: , Rfl:   •  pantoprazole (PROTONIX) 20 mg tablet, Take 20 mg by mouth, Disp: , Rfl:   •  predniSONE 50 mg tablet, Take 1 tablet (50 mg total) by mouth daily for 5 days, Disp: 5 tablet, Rfl: 0  •  raloxifene (EVISTA) 60 mg tablet, Take 60 mg by mouth, Disp: , Rfl:   •  telmisartan (MICARDIS) 40 mg tablet, telmisartan 40 mg tablet, Disp: , Rfl:   •  Turmeric 500 MG CAPS, Take by mouth, Disp: , Rfl:   •  vitamin E, tocopherol, 400 units capsule, Take 400 Units by mouth daily, Disp: , Rfl:   •  saccharomyces boulardii (FLORASTOR) 250 mg capsule, Take 250 mg by mouth 2 (two) times a day, Disp: , Rfl:   •  valsartan (DIOVAN) 320 MG tablet, , Disp: , Rfl:     Current Allergies     Allergies as of 06/03/2023 - Reviewed 06/03/2023   Allergen Reaction Noted   • Penicillins Hives and Hypertension 10/26/2015   • Sulfa antibiotics Hives and Hypertension 10/26/2015            The following portions of the patient's history were reviewed and updated as appropriate: allergies, current medications, past family history, past medical history, past social history, past surgical history and problem list      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, uses proph abx for dental procedures   • Fatty liver 2008   • Gastroparesis    • GERD (gastroesophageal reflux disease)    • Hypertension    • Mitral valve prolapse 1990   • PONV (postoperative nausea and vomiting)        Past Surgical History:   Procedure Laterality Date   • CHOLECYSTECTOMY  2008   • FOOT SURGERY Right 1999    multiple surgeries   • HYSTEROSCOPY     • KNEE CARTILAGE SURGERY Right 2004   • [place] HYSTEROSCOPY BX ENDOMETRIUM&/POLYPC W/WO D&C N/A 02/02/2023    Procedure: (D&C) W/ HYSTEROSCOPY, vulvar biopsy;  Surgeon: Martha Polanco DO;  Location: [facility];  Service: Gynecology   • SINUS ENDOSCOPY     • STRABISMUS SURGERY Bilateral 1996   • TEAR DUCT SURGERY Left 2013   • US GUIDED LIVER BIOPSY  09/21/2017   • US GUIDED LIVER BIOPSY  10/26/2015       History reviewed  No pertinent family history  Medications have been verified  Objective   /82   Pulse 92   Temp (!) 97 4 °F (36 3 °C)   Resp 20   Wt 105 kg (230 lb 9 6 oz)   SpO2 95%   BMI 39 58 kg/m²   No LMP recorded  Patient is postmenopausal        Physical Exam     Physical Exam  Vitals and nursing note reviewed  Constitutional:       General: She is not in acute distress  Appearance: Normal appearance  She is well-developed and normal weight  She is not ill-appearing or toxic-appearing  HENT:      Head: Normocephalic and atraumatic  Right Ear: Ear canal normal  Tympanic membrane is retracted  Left Ear: Ear canal normal  Tympanic membrane is retracted  Nose: Congestion and rhinorrhea present  No nasal tenderness  Right Sinus: Maxillary sinus tenderness present  Left Sinus: Maxillary sinus tenderness present        Mouth/Throat:      Mouth: Mucous membranes are moist       Pharynx: Oropharyngeal exudate and posterior oropharyngeal erythema present  Eyes:      General:         Right eye: No discharge  Left eye: No discharge  Conjunctiva/sclera: Conjunctivae normal       Pupils: Pupils are equal, round, and reactive to light  Neck:      Thyroid: No thyromegaly  Cardiovascular:      Rate and Rhythm: Normal rate  Heart sounds: Normal heart sounds  No murmur heard  No friction rub  No gallop  Pulmonary:      Effort: Pulmonary effort is normal  No respiratory distress  Breath sounds: Normal breath sounds  Abdominal:      General: Bowel sounds are normal  There is no distension  Palpations: Abdomen is soft  There is no mass  Tenderness: There is no abdominal tenderness  There is no guarding or rebound  Musculoskeletal:         General: Normal range of motion  Cervical back: Normal range of motion and neck supple  Skin:     General: Skin is warm  Coloration: Skin is not pale  Neurological:      Mental Status: She is alert and oriented to person, place, and time  Psychiatric:         Behavior: Behavior normal          Thought Content:  Thought content normal          Judgment: Judgment normal

## 2023-06-07 RX ORDER — CLINDAMYCIN HYDROCHLORIDE 150 MG/1
CAPSULE ORAL
COMMUNITY
Start: 2023-03-22

## 2023-06-10 ENCOUNTER — OFFICE VISIT (OUTPATIENT)
Dept: URGENT CARE | Facility: MEDICAL CENTER | Age: 63
End: 2023-06-10
Payer: COMMERCIAL

## 2023-06-10 VITALS
WEIGHT: 230 LBS | HEIGHT: 63 IN | RESPIRATION RATE: 20 BRPM | HEART RATE: 108 BPM | DIASTOLIC BLOOD PRESSURE: 80 MMHG | BODY MASS INDEX: 40.75 KG/M2 | TEMPERATURE: 97.7 F | SYSTOLIC BLOOD PRESSURE: 130 MMHG | OXYGEN SATURATION: 94 %

## 2023-06-10 DIAGNOSIS — J01.90 ACUTE NON-RECURRENT SINUSITIS, UNSPECIFIED LOCATION: Primary | ICD-10-CM

## 2023-06-10 DIAGNOSIS — J20.8 ACUTE BACTERIAL BRONCHITIS: ICD-10-CM

## 2023-06-10 DIAGNOSIS — B96.89 ACUTE BACTERIAL BRONCHITIS: ICD-10-CM

## 2023-06-10 PROCEDURE — 99212 OFFICE O/P EST SF 10 MIN: CPT | Performed by: PHYSICIAN ASSISTANT

## 2023-06-10 RX ORDER — AZITHROMYCIN 250 MG/1
TABLET, FILM COATED ORAL
Qty: 6 TABLET | Refills: 0 | Status: SHIPPED | OUTPATIENT
Start: 2023-06-10 | End: 2023-06-14

## 2023-06-10 RX ORDER — ALBUTEROL SULFATE 90 UG/1
2 AEROSOL, METERED RESPIRATORY (INHALATION) EVERY 4 HOURS PRN
Qty: 18 G | Refills: 0 | Status: SHIPPED | OUTPATIENT
Start: 2023-06-10 | End: 2023-07-10

## 2023-06-10 NOTE — PROGRESS NOTES
[facility]        NAME: Anastacio Vera is a 58 y o  female  : 1960    MRN: [id number]  DATE: Neelima 10, 2023  TIME: 10:54 AM    Assessment and Plan   Acute non-recurrent sinusitis, unspecified location [J01 90]  1  Acute non-recurrent sinusitis, unspecified location  azithromycin (ZITHROMAX) 250 mg tablet      2  Acute bacterial bronchitis  albuterol (PROVENTIL HFA,VENTOLIN HFA) 90 mcg/act inhaler            Patient Instructions       Follow up with PCP in 3-5 days  Proceed to  ER if symptoms worsen  Chief Complaint     Chief Complaint   Patient presents with   • Cough     Pt  C/o worsening productive cough , nasal congestion, seen last Saturday and started on prednisone for 5 days and Claritin, pt  Had slight relief with steroid         History of Present Illness       Was seen here 1 week ago diagnosed with acute sinusitis treated with prednisone and symptoms persist purulent nasal drainage productive cough same colored mucus congestion wheezing and headaches  No fevers  Review of Systems   Review of Systems   Constitutional: Negative for fever  HENT: Positive for congestion, rhinorrhea and sore throat  Respiratory: Positive for cough and wheezing  Gastrointestinal: Negative for diarrhea, nausea and vomiting  Neurological: Positive for headaches           Current Medications       Current Outpatient Medications:   •  albuterol (PROVENTIL HFA,VENTOLIN HFA) 90 mcg/act inhaler, Inhale 2 puffs every 4 (four) hours as needed for wheezing or shortness of breath, Disp: 18 g, Rfl: 0  •  azithromycin (ZITHROMAX) 250 mg tablet, Take 2 tablets today then 1 tablet daily x 4 days, Disp: 6 tablet, Rfl: 0  •  calcium-vitamin D 250-100 MG-UNIT per tablet, Take 1 tablet by mouth, Disp: , Rfl:   •  ELDERBERRY PO, Take by mouth, Disp: , Rfl:   •  folic acid (FOLVITE) 1 mg tablet, Take by mouth daily, Disp: , Rfl:   •  levothyroxine 100 mcg tablet, Take 100 mcg by mouth, Disp: , Rfl:   •  loratadine (CLARITIN) 10 mg tablet, Take 1 tablet (10 mg total) by mouth daily, Disp: 30 tablet, Rfl: 0  •  metoclopramide (REGLAN) 5 mg tablet, , Disp: , Rfl:   •  Multiple Vitamins-Minerals (MULTIVITAMIN ADULT PO), Take 1 capsule by mouth, Disp: , Rfl:   •  Omega-3 Fatty Acids (FISH OIL PO), Take 1 g by mouth, Disp: , Rfl:   •  pantoprazole (PROTONIX) 20 mg tablet, Take 20 mg by mouth, Disp: , Rfl:   •  raloxifene (EVISTA) 60 mg tablet, Take 60 mg by mouth, Disp: , Rfl:   •  telmisartan (MICARDIS) 40 mg tablet, telmisartan 40 mg tablet, Disp: , Rfl:   •  Turmeric 500 MG CAPS, Take by mouth, Disp: , Rfl:   •  vitamin E, tocopherol, 400 units capsule, Take 400 Units by mouth daily, Disp: , Rfl:   •  aspirin 81 mg chewable tablet, Chew 162 mg, Disp: , Rfl:   •  CHOLECALCIFEROL PO, Take 1,000 Units by mouth, Disp: , Rfl:   •  clindamycin (CLEOCIN) 150 mg capsule, take 4 capsules by mouth 1 hour prior to appointment, Disp: , Rfl:   •  Diclofenac Sodium (VOLTAREN) 1 %, Apply 4 g topically 4 (four) times a day, Disp: 100 g, Rfl: 2  •  Influenza Vac Types A & B PF (FLUVIRIN PRESERVATIVE FREE IM), Fluvirin 1021-5268 (PF) 45 mcg (15 mcg x 3)/0 5 mL IM syringe  inject 0 5 milliliter intramuscularly, Disp: , Rfl:     Current Allergies     Allergies as of 06/10/2023 - Reviewed 06/10/2023   Allergen Reaction Noted   • Penicillins Hives and Hypertension 10/26/2015   • Sulfa antibiotics Hives and Hypertension 10/26/2015            The following portions of the patient's history were reviewed and updated as appropriate: allergies, current medications, past family history, past medical history, past social history, past surgical history and problem list      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, uses proph abx for dental procedures   • Fatty liver 2008   • Gastroparesis    • GERD (gastroesophageal reflux disease)    • Hypertension    • Mitral valve prolapse 1990 "  • PONV (postoperative nausea and vomiting)        Past Surgical History:   Procedure Laterality Date   • CHOLECYSTECTOMY  2008   • FOOT SURGERY Right 1999    multiple surgeries   • HYSTEROSCOPY     • KNEE CARTILAGE SURGERY Right 2004   • [place] HYSTEROSCOPY BX ENDOMETRIUM&/POLYPC W/WO D&C N/A 02/02/2023    Procedure: (D&C) W/ HYSTEROSCOPY, vulvar biopsy;  Surgeon: Martha Polanco DO;  Location: [facility];  Service: Gynecology   • SINUS ENDOSCOPY     • STRABISMUS SURGERY Bilateral 1996   • TEAR DUCT SURGERY Left 2013   • US GUIDED LIVER BIOPSY  09/21/2017   • US GUIDED LIVER BIOPSY  10/26/2015       No family history on file  Medications have been verified  Objective   /80   Pulse (!) 108   Temp 97 7 °F (36 5 °C)   Resp 20   Ht 5' 3\" (1 6 m)   Wt 104 kg (230 lb)   SpO2 94%   BMI 40 74 kg/m²   No LMP recorded  Patient is postmenopausal        Physical Exam     Physical Exam  Vitals and nursing note reviewed  Constitutional:       Appearance: Normal appearance  HENT:      Head: Normocephalic and atraumatic  Right Ear: Tympanic membrane normal       Left Ear: Tympanic membrane normal       Nose: Rhinorrhea present  Comments: Right maxillary tenderness to palpation     Mouth/Throat:      Mouth: Mucous membranes are moist       Pharynx: Oropharynx is clear  Eyes:      Conjunctiva/sclera: Conjunctivae normal    Cardiovascular:      Rate and Rhythm: Normal rate and regular rhythm  Heart sounds: Normal heart sounds  Pulmonary:      Effort: Pulmonary effort is normal       Breath sounds: Normal breath sounds  Musculoskeletal:      Cervical back: Neck supple  Lymphadenopathy:      Cervical: No cervical adenopathy  Neurological:      Mental Status: She is alert                     "

## 2023-06-12 ENCOUNTER — PROCEDURE VISIT (OUTPATIENT)
Dept: PAIN MEDICINE | Facility: CLINIC | Age: 63
End: 2023-06-12
Payer: COMMERCIAL

## 2023-06-12 DIAGNOSIS — M17.0 PRIMARY OSTEOARTHRITIS OF BOTH KNEES: Primary | ICD-10-CM

## 2023-06-12 DIAGNOSIS — G89.4 CHRONIC PAIN SYNDROME: ICD-10-CM

## 2023-06-12 PROCEDURE — 20611 DRAIN/INJ JOINT/BURSA W/US: CPT | Performed by: ANESTHESIOLOGY

## 2023-06-12 RX ORDER — TRIAMCINOLONE ACETONIDE 40 MG/ML
40 INJECTION, SUSPENSION INTRA-ARTICULAR; INTRAMUSCULAR
Status: COMPLETED | OUTPATIENT
Start: 2023-06-12 | End: 2023-06-12

## 2023-06-12 RX ORDER — BUPIVACAINE HYDROCHLORIDE 2.5 MG/ML
2 INJECTION, SOLUTION INFILTRATION; PERINEURAL
Status: COMPLETED | OUTPATIENT
Start: 2023-06-12 | End: 2023-06-12

## 2023-06-12 RX ADMIN — BUPIVACAINE HYDROCHLORIDE 2 ML: 2.5 INJECTION, SOLUTION INFILTRATION; PERINEURAL at 08:20

## 2023-06-12 RX ADMIN — TRIAMCINOLONE ACETONIDE 40 MG: 40 INJECTION, SUSPENSION INTRA-ARTICULAR; INTRAMUSCULAR at 08:20

## 2023-06-12 NOTE — PATIENT INSTRUCTIONS
Do not apply heat to any area that is numb  If you have discomfort or soreness at the injection site, you may apply ice today, 20 minutes on and 20 minutes off  Tomorrow you may use ice or warm, moist heat  Do not apply ice or heat directly to the skin  If you experience severe shortness of breath, go to the Emergency Room  You may have numbness for several hours from the local anesthetic  Please use caution and common sense, especially with weight-bearing activities  You may have an increase or change in the discomfort for 36-48 hours after your treatment  Apply ice and continue with any pain medicine you have been prescribed  Do not do anything strenuous today  You may shower, but no tub baths or hot tubs today  You may resume your normal activities tomorrow, but do not “overdo it”  Resume normal activities slowly when you are feeling better  If you experience redness, drainage or swelling at the injection site, or if you develop a fever above 100 degrees, please call [facility] at [phone] or go to the Emergency Room  Continue to take all routine medicines prescribed by your primary care physician unless otherwise instructed by our staff  Most blood thinners should be started again according to your regularly scheduled dosing  If you have any questions, please give our office a call  As no general anesthesia was used in today's procedure, you should not experience any side effects related to anesthesia  If you have a problem specifically related to your procedure, please call our office at [phone]  Problems not related to your procedure should be directed to your primary care physician

## 2023-06-12 NOTE — PROGRESS NOTES
"Large joint arthrocentesis: L knee  Universal Protocol:  Consent: Verbal consent obtained  Written consent obtained  Risks and benefits: risks, benefits and alternatives were discussed  Consent given by: patient  Time out: Immediately prior to procedure a \"time out\" was called to verify the correct patient, procedure, equipment, support staff and site/side marked as required  Timeout called at: 6/12/2023 8:40 AM   Patient understanding: patient states understanding of the procedure being performed  Patient consent: the patient's understanding of the procedure matches consent given  Procedure consent: procedure consent matches procedure scheduled  Relevant documents: relevant documents present and verified  Test results: test results available and properly labeled  Site marked: the operative site was marked  Radiology Images displayed and confirmed   If images not available, report reviewed: imaging studies not available  Required items: required blood products, implants, devices, and special equipment available  Patient identity confirmed: verbally with patient    Supporting Documentation  Indications: pain   Procedure Details  Location: knee - L knee  Preparation: Patient was prepped and draped in the usual sterile fashion  Needle size: 22 G  Ultrasound guidance: yes  Approach: anterolateral  Medications administered: 2 mL bupivacaine 0 25 %; 40 mg triamcinolone acetonide 40 mg/mL    Patient tolerance: patient tolerated the procedure well with no immediate complications  Dressing:  Sterile dressing applied          "

## 2023-06-19 ENCOUNTER — TELEPHONE (OUTPATIENT)
Dept: PAIN MEDICINE | Facility: CLINIC | Age: 63
End: 2023-06-19

## 2023-07-20 ENCOUNTER — OFFICE VISIT (OUTPATIENT)
Dept: PAIN MEDICINE | Facility: CLINIC | Age: 63
End: 2023-07-20
Payer: COMMERCIAL

## 2023-07-20 VITALS
WEIGHT: 233.8 LBS | HEART RATE: 77 BPM | SYSTOLIC BLOOD PRESSURE: 139 MMHG | RESPIRATION RATE: 16 BRPM | HEIGHT: 63 IN | DIASTOLIC BLOOD PRESSURE: 83 MMHG | BODY MASS INDEX: 41.43 KG/M2

## 2023-07-20 DIAGNOSIS — M17.12 PRIMARY OSTEOARTHRITIS OF LEFT KNEE: ICD-10-CM

## 2023-07-20 DIAGNOSIS — G89.4 CHRONIC PAIN SYNDROME: Primary | ICD-10-CM

## 2023-07-20 PROCEDURE — 99213 OFFICE O/P EST LOW 20 MIN: CPT | Performed by: NURSE PRACTITIONER

## 2023-07-20 RX ORDER — CLOBETASOL PROPIONATE 0.5 MG/G
OINTMENT TOPICAL
COMMUNITY
Start: 2023-02-16

## 2023-07-20 NOTE — PROGRESS NOTES
Assessment:  1. Chronic pain syndrome    2. Primary osteoarthritis of left knee        Plan:  While the patient was in the office today, I did have a thorough conversation regarding their chronic pain syndrome, medication management, and treatment plan options. Patient is being seen for follow-up visit. She underwent a left knee joint injection on 6/12/2023. Patient is reporting 75% improvement in her symptoms. Pain is pretty minimal at this time. We will repeat left knee joint injection as needed. Encouraged her to continue with aerobic and weightbearing exercises. I discussed with the patient that at this point time she can follow up with our office on an as-needed basis. I did review the patient that if her pain symptoms should change, worsen, and/or if she would experience any new symptoms as she would like to be evaluated for, she should give our office a call. The patient was agreeable and verbalized an understanding. History of Present Illness: The patient is a 58 y.o. female who presents for a follow up office visit in regards to Knee Pain. The patient’s current symptoms include complaints of left knee pain. Current pain levels a 4/10. Quality pain is described as burning. Current pain medications includes: None. I have personally reviewed and/or updated the patient's past medical history, past surgical history, family history, social history, current medications, allergies, and vital signs today. Review of Systems  Review of Systems   Musculoskeletal:        Joint stiffness   All other systems reviewed and are negative.           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, uses proph abx for dental procedures   • Fatty liver 2008   • Gastroparesis    • GERD (gastroesophageal reflux disease)    • Hypertension    • Mitral valve prolapse 1990   • PONV (postoperative nausea and vomiting)        Past Surgical History:   Procedure Laterality Date   • CHOLECYSTECTOMY  2008   • FOOT SURGERY Right 1999    multiple surgeries   • HYSTEROSCOPY     • KNEE CARTILAGE SURGERY Right 2004   • [facility] HYSTEROSCOPY BX ENDOMETRIUM&/POLYPC W/WO D&C N/A 02/02/2023    Procedure: (D&C) W/ HYSTEROSCOPY, vulvar biopsy;  Surgeon: Martha Polanco DO;  Location: [facility];  Service: Gynecology   • SINUS ENDOSCOPY     • STRABISMUS SURGERY Bilateral 1996   • TEAR DUCT SURGERY Left 2013   • US GUIDED LIVER BIOPSY  09/21/2017   • US GUIDED LIVER BIOPSY  10/26/2015       History reviewed. No pertinent family history. Social History     Occupational History   • Not on file   Tobacco Use   • Smoking status: Never   • Smokeless tobacco: Never   Vaping Use   • Vaping Use: Never used   Substance and Sexual Activity   • Alcohol use: Yes     Comment: 1-2/week   • Drug use: Never   • Sexual activity: Not on file         Current Outpatient Medications:   •  aspirin 81 mg chewable tablet, Chew 162 mg, Disp: , Rfl:   •  calcium-vitamin D 250-100 MG-UNIT per tablet, Take 1 tablet by mouth, Disp: , Rfl:   •  CHOLECALCIFEROL PO, Take 1,000 Units by mouth, Disp: , Rfl:   •  clindamycin (CLEOCIN) 150 mg capsule, take 4 capsules by mouth 1 hour prior to appointment, Disp: , Rfl:   •  clobetasol (TEMOVATE) 0.05 % ointment, Apply to affected area twice daily for 4 weeks, then daily at bedtime for 4 weeks, then every other night for 4 weeks, then twice a week for maintenance.  for 90 days, Disp: , Rfl:   •  Diclofenac Sodium (VOLTAREN) 1 %, Apply 4 g topically 4 (four) times a day, Disp: 100 g, Rfl: 2  •  ELDERBERRY PO, Take by mouth, Disp: , Rfl:   •  folic acid (FOLVITE) 1 mg tablet, Take by mouth daily, Disp: , Rfl:   •  Influenza Vac Typ A&B Surf Ant (FLUVIRIN IM), inject 0.5 milliliter intramuscularly, Disp: , Rfl:   •  Influenza Vac Types A & B PF (FLUVIRIN PRESERVATIVE FREE IM), Fluvirin 2494-6900 (PF) 45 mcg (15 mcg x 3)/0.5 mL IM syringe  inject 0.5 milliliter intramuscularly, Disp: , Rfl:   •  levothyroxine 100 mcg tablet, Take 100 mcg by mouth, Disp: , Rfl:   •  loratadine (CLARITIN) 10 mg tablet, Take 1 tablet (10 mg total) by mouth daily, Disp: 30 tablet, Rfl: 0  •  metoclopramide (REGLAN) 5 mg tablet, , Disp: , Rfl:   •  Multiple Vitamins-Minerals (MULTIVITAMIN ADULT PO), Take 1 capsule by mouth, Disp: , Rfl:   •  Omega-3 Fatty Acids (FISH OIL PO), Take 1 g by mouth, Disp: , Rfl:   •  pantoprazole (PROTONIX) 20 mg tablet, Take 20 mg by mouth, Disp: , Rfl:   •  raloxifene (EVISTA) 60 mg tablet, Take 60 mg by mouth, Disp: , Rfl:   •  telmisartan (MICARDIS) 40 mg tablet, telmisartan 40 mg tablet, Disp: , Rfl:   •  Turmeric 500 MG CAPS, Take by mouth, Disp: , Rfl:   •  vitamin E, tocopherol, 400 units capsule, Take 400 Units by mouth daily, Disp: , Rfl:     Allergies   Allergen Reactions   • Penicillin G Other (See Comments)   • Penicillins Hives and Hypertension   • Sulfa Antibiotics Hives and Hypertension       Physical Exam:    /83 (BP Location: Right arm, Patient Position: Sitting, Cuff Size: Large)   Pulse 77   Resp 16   Ht 5' 3" (1.6 m)   Wt 106 kg (233 lb 12.8 oz)   BMI 41.42 kg/m²     Constitutional:normal, well developed, well nourished, alert, in no distress and non-toxic and no overt pain behavior.  and overweight  Eyes:anicteric  HEENT:grossly intact  Neck:supple, symmetric, trachea midline and no masses   Pulmonary:even and unlabored  Cardiovascular:No edema or pitting edema present  Skin:Normal without rashes or lesions and well hydrated  Psychiatric:Mood and affect appropriate  Neurologic:Cranial Nerves II-XII grossly intact  Musculoskeletal:normal    Imaging    Study Result    Narrative & Impression   LEFT KNEE     INDICATION:   M25.562: Pain in left knee.     COMPARISON:  None     VIEWS:  XR KNEE 3 VW LEFT NON INJURY         FINDINGS:     There is no acute fracture or dislocation.     There is a small joint effusion.     Marked medial compartment, mild lateral compartment and slight lateral patellofemoral compartment narrowing, medial and lateral marginal, posterior patella, distal anterior femur small spurs and anterior patella enthesophyte.     No lytic or blastic lesions are seen.     Soft tissues are unremarkable.     IMPRESSION:     No acute osseous abnormality. Osteoarthritic joint changes. vv           No orders to display       No orders of the defined types were placed in this encounter.

## 2023-08-02 PROBLEM — J01.90 ACUTE SINUSITIS: Status: RESOLVED | Noted: 2023-06-03 | Resolved: 2023-08-02

## 2023-09-07 DIAGNOSIS — E03.9 HYPOTHYROIDISM, UNSPECIFIED TYPE: Primary | ICD-10-CM

## 2023-09-07 RX ORDER — LEVOTHYROXINE SODIUM 0.1 MG/1
TABLET ORAL
Qty: 135 TABLET | Refills: 1 | Status: CANCELLED | OUTPATIENT
Start: 2023-09-07

## 2023-09-07 RX ORDER — LEVOTHYROXINE SODIUM 0.1 MG/1
TABLET ORAL
Qty: 120 TABLET | Refills: 3 | Status: SHIPPED | OUTPATIENT
Start: 2023-09-07 | End: 2023-09-08 | Stop reason: SDUPTHER

## 2023-09-08 DIAGNOSIS — E03.9 HYPOTHYROIDISM, UNSPECIFIED TYPE: ICD-10-CM

## 2023-09-08 RX ORDER — LEVOTHYROXINE SODIUM 0.1 MG/1
TABLET ORAL
Qty: 120 TABLET | Refills: 3 | Status: SHIPPED | OUTPATIENT
Start: 2023-09-08

## 2023-10-04 ENCOUNTER — TELEPHONE (OUTPATIENT)
Dept: ADMINISTRATIVE | Facility: OTHER | Age: 63
End: 2023-10-04

## 2023-10-04 NOTE — TELEPHONE ENCOUNTER
----- Message from Heaven Mack MA sent at 10/3/2023  2:45 PM EDT -----  Regarding: MAMMO  10/03/23 2:45 PM    Hello, our patient Tess Ramos has had Mammogram completed/performed. Please assist in updating the patient chart by pulling the Care Everywhere (CE) document. The date of service is 5/17/2023.      Thank you,  [facility]

## 2023-10-04 NOTE — TELEPHONE ENCOUNTER
Upon review of the In Basket request we were able to locate, review, and update the patient chart as requested for Mammogram.    Any additional questions or concerns should be emailed to the Practice Liaisons via the appropriate education email address, please do not reply via In Basket.     Thank you  Tanya Ledbetter MA

## 2023-10-31 ENCOUNTER — OFFICE VISIT (OUTPATIENT)
Dept: FAMILY MEDICINE CLINIC | Facility: CLINIC | Age: 63
End: 2023-10-31
Payer: COMMERCIAL

## 2023-10-31 ENCOUNTER — TELEPHONE (OUTPATIENT)
Dept: ADMINISTRATIVE | Facility: OTHER | Age: 63
End: 2023-10-31

## 2023-10-31 VITALS
WEIGHT: 239 LBS | BODY MASS INDEX: 42.35 KG/M2 | DIASTOLIC BLOOD PRESSURE: 84 MMHG | TEMPERATURE: 97.4 F | HEART RATE: 90 BPM | OXYGEN SATURATION: 96 % | HEIGHT: 63 IN | SYSTOLIC BLOOD PRESSURE: 120 MMHG

## 2023-10-31 DIAGNOSIS — J32.9 SINUSITIS, UNSPECIFIED CHRONICITY, UNSPECIFIED LOCATION: Primary | ICD-10-CM

## 2023-10-31 DIAGNOSIS — J40 BRONCHITIS: ICD-10-CM

## 2023-10-31 PROCEDURE — 99213 OFFICE O/P EST LOW 20 MIN: CPT | Performed by: NURSE PRACTITIONER

## 2023-10-31 PROCEDURE — 96372 THER/PROPH/DIAG INJ SC/IM: CPT

## 2023-10-31 RX ORDER — DEXAMETHASONE SODIUM PHOSPHATE 10 MG/ML
10 INJECTION INTRAMUSCULAR; INTRAVENOUS ONCE
Status: COMPLETED | OUTPATIENT
Start: 2023-10-31 | End: 2023-10-31

## 2023-10-31 RX ORDER — AZITHROMYCIN 250 MG/1
TABLET, FILM COATED ORAL
Qty: 6 TABLET | Refills: 0 | Status: SHIPPED | OUTPATIENT
Start: 2023-10-31 | End: 2023-11-05

## 2023-10-31 RX ADMIN — DEXAMETHASONE SODIUM PHOSPHATE 10 MG: 10 INJECTION INTRAMUSCULAR; INTRAVENOUS at 15:17

## 2023-10-31 NOTE — LETTER
Procedure Request Form: Colonoscopy      Date Requested: 10/31/23  Patient: Rachid Guidry  Patient : 1960   Referring Provider: Patricia Elizabeth MD        Date of Procedure ______________________________       The above patient has informed us that they have completed their   most recent Colonoscopy at your facility. Please complete   this form and attach all corresponding procedure reports/results. Comments __________________________________________________________  ____________________________________________________________________  ____________________________________________________________________  ____________________________________________________________________    Facility Completing Procedure _________________________________________    Form Completed By (print name) _______________________________________      Signature __________________________________________________________      These reports are needed for  compliance. Please fax this completed form and a copy of the procedure report to our office located at [address] [place] [address] as soon as possible to Fax [phone] briana Spring: Phone [phone]    We thank you for your assistance in treating our mutual patient.

## 2023-10-31 NOTE — TELEPHONE ENCOUNTER
Upon review of the In Basket request and the patient's chart, initial outreach has been made via fax to facility. Please see Contacts section for details.      Thank you  Rebeca Bartlett MA

## 2023-10-31 NOTE — TELEPHONE ENCOUNTER
----- Message from Mitch Griffin MA sent at 10/31/2023 11:29 AM EDT -----  Regarding: pap  10/31/23 11:29 AM    Hello, our patient Jodie Granda has had Pap Smear (HPV) aka Cervical Cancer Screening completed/performed. Please assist in updating the patient chart by pulling the Care Everywhere (CE) document. The date of service is 2016.      Thank you,  [facility] Seo [facility]

## 2023-10-31 NOTE — PROGRESS NOTES
Name: Rosalinda Thacker      : 1960      MRN: [id number]  Encounter Provider: CLYDE Cavanaugh  Encounter Date: 10/31/2023   Encounter department: [address] [facility]    Assessment & Plan     1. Sinusitis, unspecified chronicity, unspecified location  Comments: Follow-up if unresolved  Orders:  -     dexamethasone (DECADRON) injection 10 mg  -     azithromycin (Zithromax) 250 mg tablet; Take 2 tablets (500 mg total) by mouth daily for 1 day, THEN 1 tablet (250 mg total) daily for 4 days. 2. Bronchitis  Comments:  Mild asthmatic use inhaler. Orders:  -     dexamethasone (DECADRON) injection 10 mg  -     azithromycin (Zithromax) 250 mg tablet; Take 2 tablets (500 mg total) by mouth daily for 1 day, THEN 1 tablet (250 mg total) daily for 4 days. Depression Screening and Follow-up Plan: Patient was screened for depression during today's encounter. They screened negative with a PHQ-2 score of 0. Subjective      Cold sx since 6 d ago, nasal prod-yellow, no cp or SOB. Cold 3 weeks as well  Taking mucinex d, flonase   No SOB, used inhaler in  for similar, chest not as bad yet      Review of Systems   Constitutional:  Negative for chills and diaphoresis. HENT:  Positive for congestion, postnasal drip, rhinorrhea and sinus pressure. Negative for sore throat. Respiratory:  Negative for apnea, chest tightness, shortness of breath and wheezing. Current Outpatient Medications on File Prior to Visit   Medication Sig    aspirin 81 mg chewable tablet Chew 162 mg    calcium-vitamin D 250-100 MG-UNIT per tablet Take 1 tablet by mouth    CHOLECALCIFEROL PO Take 1,000 Units by mouth    clindamycin (CLEOCIN) 150 mg capsule take 4 capsules by mouth 1 hour prior to appointment    clobetasol (TEMOVATE) 0.05 % ointment Apply to affected area twice daily for 4 weeks, then daily at bedtime for 4 weeks, then every other night for 4 weeks, then twice a week for maintenance.  for 90 days Diclofenac Sodium (VOLTAREN) 1 % Apply 4 g topically 4 (four) times a day    ELDERBERRY PO Take by mouth    folic acid (FOLVITE) 1 mg tablet Take by mouth daily    Influenza Vac Typ A&B Surf Ant (FLUVIRIN IM) inject 0.5 milliliter intramuscularly    Influenza Vac Types A & B PF (FLUVIRIN PRESERVATIVE FREE IM) Fluvirin 1661-9605 (PF) 45 mcg (15 mcg x 3)/0.5 mL IM syringe   inject 0.5 milliliter intramuscularly    metoclopramide (REGLAN) 5 mg tablet     Multiple Vitamins-Minerals (MULTIVITAMIN ADULT PO) Take 1 capsule by mouth    Omega-3 Fatty Acids (FISH OIL PO) Take 1 g by mouth    pantoprazole (PROTONIX) 20 mg tablet Take 20 mg by mouth    raloxifene (EVISTA) 60 mg tablet Take 60 mg by mouth    telmisartan (MICARDIS) 40 mg tablet telmisartan 40 mg tablet    Turmeric 500 MG CAPS Take by mouth    vitamin E, tocopherol, 400 units capsule Take 400 Units by mouth daily    levothyroxine 100 mcg tablet 1 tablet alternating with 1&1/2 tablets daily    loratadine (CLARITIN) 10 mg tablet Take 1 tablet (10 mg total) by mouth daily       Objective     /84 (BP Location: Left arm, Patient Position: Sitting, Cuff Size: Large)   Pulse 90   Temp (!) 97.4 °F (36.3 °C) (Tympanic)   Ht 5' 3" (1.6 m)   Wt 108 kg (239 lb)   SpO2 96%   BMI 42.34 kg/m²     Physical Exam  Vitals and nursing note reviewed. Constitutional:       General: She is not in acute distress. Appearance: Normal appearance. She is not toxic-appearing. HENT:      Head: Normocephalic. Comments: Ears with TMs intact mild  congestion bilaterally     Nose: Congestion present. Mouth/Throat:      Comments: Sounds congested  Eyes:      Conjunctiva/sclera: Conjunctivae normal.   Cardiovascular:      Rate and Rhythm: Normal rate and regular rhythm. Heart sounds: Normal heart sounds. Comments: Occasional cough  Pulmonary:      Effort: Pulmonary effort is normal.      Breath sounds: Normal breath sounds.    Skin:     General: Skin is warm and dry. Neurological:      General: No focal deficit present. Mental Status: She is alert and oriented to person, place, and time.    Psychiatric:         Mood and Affect: Mood normal.       CLYDE Cage

## 2023-10-31 NOTE — TELEPHONE ENCOUNTER
Upon review of the In Basket request we were able to locate, review, and update the patient chart as requested for Pap Smear (HPV) aka Cervical Cancer Screening. Any additional questions or concerns should be emailed to the Practice Liaisons via the appropriate education email address, please do not reply via In Basket.     Thank you  Charisma Nunez MA

## 2023-10-31 NOTE — TELEPHONE ENCOUNTER
----- Message from Maribel Hdz MA sent at 10/31/2023 11:28 AM EDT -----  Regarding: colonoscopy  10/31/23 11:28 AM    Hello, our patient Carmen Ferraro has had CRC: Colonoscopy completed/performed. Please assist in updating the patient chart by making an External outreach to  Carmelo Gillis, [address] [place] [address], [address] [place] [address]   [phone] (Work)   [phone] (Fax)        The date of service is 8/18/2022.     Thank you,  Maribel Hdz MA  [facility]

## 2023-11-02 NOTE — TELEPHONE ENCOUNTER
Upon review of the In Basket request we were able to locate, review, and update the patient chart as requested for CRC: Colonoscopy. Any additional questions or concerns should be emailed to the Practice Liaisons via the appropriate education email address, please do not reply via In Basket.     Thank you  Tanya Ledbetter MA

## 2024-03-21 DIAGNOSIS — I10 ESSENTIAL HYPERTENSION: Primary | ICD-10-CM

## 2024-03-21 RX ORDER — TELMISARTAN 40 MG/1
40 TABLET ORAL DAILY
Qty: 90 TABLET | Refills: 1 | Status: SHIPPED | OUTPATIENT
Start: 2024-03-21

## 2024-03-28 ENCOUNTER — TELEPHONE (OUTPATIENT)
Dept: FAMILY MEDICINE CLINIC | Facility: CLINIC | Age: 64
End: 2024-03-28

## 2024-04-11 ENCOUNTER — OFFICE VISIT (OUTPATIENT)
Dept: FAMILY MEDICINE CLINIC | Facility: CLINIC | Age: 64
End: 2024-04-11
Payer: COMMERCIAL

## 2024-04-11 VITALS
SYSTOLIC BLOOD PRESSURE: 128 MMHG | WEIGHT: 238.2 LBS | TEMPERATURE: 96 F | HEIGHT: 63 IN | DIASTOLIC BLOOD PRESSURE: 82 MMHG | OXYGEN SATURATION: 94 % | HEART RATE: 88 BPM | BODY MASS INDEX: 42.21 KG/M2

## 2024-04-11 DIAGNOSIS — N95.0 POSTMENOPAUSAL VAGINAL BLEEDING: ICD-10-CM

## 2024-04-11 DIAGNOSIS — M17.12 PRIMARY OSTEOARTHRITIS OF LEFT KNEE: ICD-10-CM

## 2024-04-11 DIAGNOSIS — E78.2 MIXED HYPERLIPIDEMIA: ICD-10-CM

## 2024-04-11 DIAGNOSIS — Z00.00 ENCOUNTER FOR ANNUAL PHYSICAL EXAM: Primary | ICD-10-CM

## 2024-04-11 DIAGNOSIS — E03.9 ACQUIRED HYPOTHYROIDISM: ICD-10-CM

## 2024-04-11 DIAGNOSIS — Z23 ENCOUNTER FOR IMMUNIZATION: ICD-10-CM

## 2024-04-11 DIAGNOSIS — I10 PRIMARY HYPERTENSION: ICD-10-CM

## 2024-04-11 DIAGNOSIS — E55.9 VITAMIN D DEFICIENCY: ICD-10-CM

## 2024-04-11 DIAGNOSIS — K75.81 NASH (NONALCOHOLIC STEATOHEPATITIS): ICD-10-CM

## 2024-04-11 PROBLEM — K31.84 GASTROPARESIS: Status: ACTIVE | Noted: 2024-04-11

## 2024-04-11 PROBLEM — K21.9 GERD (GASTROESOPHAGEAL REFLUX DISEASE): Status: ACTIVE | Noted: 2024-04-11

## 2024-04-11 PROCEDURE — 90677 PCV20 VACCINE IM: CPT | Performed by: FAMILY MEDICINE

## 2024-04-11 PROCEDURE — 99396 PREV VISIT EST AGE 40-64: CPT | Performed by: FAMILY MEDICINE

## 2024-04-11 PROCEDURE — 90471 IMMUNIZATION ADMIN: CPT | Performed by: FAMILY MEDICINE

## 2024-04-11 NOTE — ASSESSMENT & PLAN NOTE
Further labs ordered today.  Prevnar given.  I encouraged flu VAX this coming fall.  She is not interested in COVID vaccinations.  I also feel she does not want RSV at this time.  No other testing is up-to-date

## 2024-04-11 NOTE — ASSESSMENT & PLAN NOTE
Likely also with pes anserine bursitis.  Advised topical agents and definitely follow-up with Ortho soon as insurance changes probable steroid injection and/or viscosupplementation.

## 2024-04-11 NOTE — ASSESSMENT & PLAN NOTE
He does have upcoming follow-up with gynecology.  Of course if symptoms recur she call for sooner appointment or to the emergency room.

## 2024-04-11 NOTE — ASSESSMENT & PLAN NOTE
Recent liver function test within normal limits.  Courage continued diet and follow-up with Dr. Baltazar

## 2024-04-15 NOTE — PROGRESS NOTES
ADULT ANNUAL PHYSICAL  [facility]    NAME: Margaret Moore  AGE: 63 y.o. SEX: female  : 1960     DATE: 2024     Assessment and Plan:     Problem List Items Addressed This Visit     Primary osteoarthritis of knee     Likely also with pes anserine bursitis.  Advised topical agents and definitely follow-up with Ortho soon as insurance changes probable steroid injection and/or viscosupplementation.         Encounter for annual physical exam - Primary     Further labs ordered today.  Prevnar given.  I encouraged flu VAX this coming fall.  She is not interested in COVID vaccinations.  I also feel she does not want RSV at this time.  No other testing is up-to-date         Hypertension     Controlled         Hypothyroidism    Relevant Orders    TSH, 3rd generation with Free T4 reflex    MILLS (nonalcoholic steatohepatitis)     Recent liver function test within normal limits.  Courage continued diet and follow-up with Dr. Baltazar         Postmenopausal vaginal bleeding     He does have upcoming follow-up with gynecology.  Of course if symptoms recur she call for sooner appointment or to the emergency room.        Other Visit Diagnoses     Encounter for immunization        Relevant Orders    Pneumococcal Conjugate Vaccine 20-valent (Pcv20) (Completed)    Mixed hyperlipidemia        Relevant Orders    Lipid panel    Vitamin D deficiency        Relevant Orders    Vitamin D 25 hydroxy          Immunizations and preventive care screenings were discussed with patient today. Appropriate education was printed on patient's after visit summary.    Counseling:  Alcohol/drug use: discussed moderation in alcohol intake, the recommendations for healthy alcohol use, and avoidance of illicit drug use.  Dental Health: discussed importance of regular tooth brushing, flossing, and dental visits.  Injury prevention: discussed safety/seat belts, safety helmets, smoke  detectors, carbon dioxide detectors, and smoking near bedding or upholstery.  Sexual health: discussed sexually transmitted diseases, partner selection, use of condoms, avoidance of unintended pregnancy, and contraceptive alternatives.  Exercise: the importance of regular exercise/physical activity was discussed. Recommend exercise 3-5 times per week for at least 30 minutes.       Depression Screening and Follow-up Plan: Patient was screened for depression during today's encounter. They screened negative with a PHQ-2 score of 0.        Return in about 1 year (around 4/11/2025).     Chief Complaint:     Chief Complaint   Patient presents with   • Annual Exam      History of Present Illness:     Adult Annual Physical   Patient here for a comprehensive physical exam. The patient reports no problems.    Diet and Physical Activity  Diet/Nutrition: well balanced diet.   Exercise: no formal exercise.      Depression Screening  PHQ-2/9 Depression Screening    Little interest or pleasure in doing things: 0 - not at all  Feeling down, depressed, or hopeless: 0 - not at all  PHQ-2 Score: 0  PHQ-2 Interpretation: Negative depression screen       General Health  Sleep: sleeps well.   Hearing: normal - bilateral.  Vision: no vision problems.   Dental: regular dental visits.       /GYN Health  Follows with gynecology? yes   Patient is: postmenopausal  Last menstrual period: 14 years ago  Contraceptive method: menopause.    Advanced Care Planning  Do you have an advanced directive? no  Do you have a durable medical power of ? no  ACP document given to the patient? no     Review of Systems:     Review of Systems   Constitutional:  Negative for chills and fever.   HENT:  Negative for ear pain and sore throat.    Eyes:  Negative for pain and visual disturbance.   Respiratory:  Negative for cough and shortness of breath.    Cardiovascular:  Negative for chest pain, palpitations and leg swelling.   Gastrointestinal:  Negative  "for abdominal pain, blood in stool and vomiting.   Genitourinary:  Positive for vaginal bleeding. Negative for dysuria and hematuria.   Musculoskeletal:  Positive for arthralgias and joint swelling. Negative for back pain.   Skin:  Negative for color change and rash.   Neurological:  Negative for seizures and syncope.   Hematological:  Does not bruise/bleed easily.   All other systems reviewed and are negative.   patient really denies any medical problems.  She has several trips upcoming.  She did have recent labs for Dr. Baltazar all stable , therefore due for follow-up with him within 6 months.  Her steatosis is currently being treated with diet alone.  She had colonoscopy several years ago showing 2 polyps therefore to repeat in 5 years.  She denies any GI complaints or problems with her bowel movements.  She does follow-up routinely with gynecology due to history of a right ovarian cyst; most recent ultrasound not in the cyst well due to overlying bowel.  4 weeks ago she had a small amount of vaginal bleeding which was the first bleeding since menopause years ago.  Came and went quickly without recurrence and without pain or vaginal discharge.  Also she denies any problems voiding.  She does have follow-up with GYN in May..  Also is scheduled for routine mammogram in May.  She does have chronic left knee pain for which she had an injection about a year ago with minimal relief.  She states become swollen with pain especially over medial aspect and worse at bedtime.  She states x-ray in past did show \"bone-on-bone\".  She denies buckling.  She does use topical agents with some relief.  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, uses proph abx for dental procedures   • Fatty liver 2008   • " Gastroparesis    • GERD (gastroesophageal reflux disease)    • Heart murmur    • Hypertension    • Mitral valve prolapse 1990   • PONV (postoperative nausea and vomiting)       Past Surgical History:     Past Surgical History:   Procedure Laterality Date   • CHOLECYSTECTOMY  2008   • FOOT SURGERY Right 1999    multiple surgeries   • HYSTEROSCOPY     • KNEE CARTILAGE SURGERY Right 2004   • TX HYSTEROSCOPY BX ENDOMETRIUM&/POLYPC W/WO D&C N/A 02/02/2023    Procedure: (D&C) W/ HYSTEROSCOPY, vulvar biopsy;  Surgeon: Martha Polanco DO;  Location: [facility];  Service: Gynecology   • SINUS ENDOSCOPY     • STRABISMUS SURGERY Bilateral 1996   • TEAR DUCT SURGERY Left 2013   • US GUIDED LIVER BIOPSY  09/21/2017   • US GUIDED LIVER BIOPSY  10/26/2015      Social History:     Social History     Socioeconomic History   • Marital status:      Spouse name: None   • Number of children: None   • Years of education: None   • Highest education level: None   Occupational History   • None   Tobacco Use   • Smoking status: Never   • Smokeless tobacco: Never   Vaping Use   • Vaping status: Never Used   Substance and Sexual Activity   • Alcohol use: Yes     Alcohol/week: 4.0 standard drinks of alcohol     Types: 2 Glasses of wine, 2 Cans of beer per week     Comment: 1-2/week   • Drug use: Never   • Sexual activity: Not Currently     Partners: Male     Birth control/protection: Post-menopausal   Other Topics Concern   • None   Social History Narrative   • None     Social Determinants of Health     Financial Resource Strain: Not on file   Food Insecurity: Not on file   Transportation Needs: Not on file   Physical Activity: Not on file   Stress: Not on file   Social Connections: Not on file   Intimate Partner Violence: Not on file   Housing Stability: Not on file      Family History:     Family History   Problem Relation Age of Onset   • Hypertension Mother    • Diabetes Mother    • Thyroid disease Mother    • Arthritis Mother     • Breast cancer Mother    • Cancer Mother    • Heart disease Father    • Breast cancer Maternal Grandmother    • Cancer Maternal Grandmother    • Heart disease Paternal Grandmother    • Stroke Paternal Uncle    • Stroke Paternal Uncle    • Breast cancer Maternal Aunt    • Cancer Maternal Aunt    • Breast cancer Maternal Aunt    • Cancer Maternal Aunt    • Breast cancer Cousin    • Breast cancer Cousin    • Breast cancer Cousin    • Completed Suicide  Cousin       Current Medications:     Current Outpatient Medications   Medication Sig Dispense Refill   • aspirin 81 mg chewable tablet Chew 162 mg     • calcium-vitamin D 250-100 MG-UNIT per tablet Take 1 tablet by mouth     • clindamycin (CLEOCIN) 150 mg capsule take 4 capsules by mouth 1 hour prior to appointment     • clobetasol (TEMOVATE) 0.05 % ointment Apply to affected area twice daily for 4 weeks, then daily at bedtime for 4 weeks, then every other night for 4 weeks, then twice a week for maintenance. for 90 days     • ELDERBERRY PO Take by mouth     • folic acid (FOLVITE) 1 mg tablet Take by mouth daily     • levothyroxine 100 mcg tablet 1 tablet alternating with 1&1/2 tablets daily 120 tablet 3   • metoclopramide (REGLAN) 5 mg tablet      • Multiple Vitamins-Minerals (MULTIVITAMIN ADULT PO) Take 1 capsule by mouth     • Omega-3 Fatty Acids (FISH OIL PO) Take 1 g by mouth     • pantoprazole (PROTONIX) 20 mg tablet Take 20 mg by mouth     • raloxifene (EVISTA) 60 mg tablet Take 60 mg by mouth     • telmisartan (MICARDIS) 40 mg tablet Take 1 tablet (40 mg total) by mouth daily 90 tablet 1   • Turmeric 500 MG CAPS Take by mouth     • vitamin E, tocopherol, 400 units capsule Take 400 Units by mouth daily     • Influenza Vac Typ A&B Surf Ant (FLUVIRIN IM) inject 0.5 milliliter intramuscularly     • Influenza Vac Types A & B PF (FLUVIRIN PRESERVATIVE FREE IM) Fluvirin 2156-4173 (PF) 45 mcg (15 mcg x 3)/0.5 mL IM syringe   inject 0.5 milliliter intramuscularly    "  • loratadine (CLARITIN) 10 mg tablet Take 1 tablet (10 mg total) by mouth daily 30 tablet 0     No current facility-administered medications for this visit.      Allergies:     Allergies   Allergen Reactions   • Penicillin G Other (See Comments)   • Penicillins Hives and Hypertension   • Sulfa Antibiotics Hives and Hypertension      Physical Exam:     /82 (BP Location: Left arm, Patient Position: Sitting, Cuff Size: Large)   Pulse 88   Temp (!) 96 °F (35.6 °C) (Tympanic)   Ht 5' 3\" (1.6 m)   Wt 108 kg (238 lb 3.2 oz)   SpO2 94%   BMI 42.20 kg/m²     Physical Exam  Vitals and nursing note reviewed.   Constitutional:       General: She is not in acute distress.     Appearance: Normal appearance. She is well-developed. She is obese.   HENT:      Head: Normocephalic and atraumatic.   Eyes:      Conjunctiva/sclera: Conjunctivae normal.   Neck:      Vascular: No carotid bruit.   Cardiovascular:      Rate and Rhythm: Normal rate and regular rhythm.      Heart sounds: No murmur heard.  Pulmonary:      Effort: Pulmonary effort is normal. No respiratory distress.      Breath sounds: Normal breath sounds.   Abdominal:      Palpations: Abdomen is soft. There is no mass.      Tenderness: There is no abdominal tenderness.   Musculoskeletal:         General: No swelling.      Cervical back: Neck supple.      Right lower leg: No edema.      Left lower leg: No edema.      Comments: Left knee without appreciable swelling, warmth, or redness.  Good range of motion without discomfort.  No increased laxity or anterior drawer sign.  She does have tenderness to palpation especially over the medial aspect of knee   Lymphadenopathy:      Cervical: No cervical adenopathy.   Skin:     General: Skin is warm and dry.      Capillary Refill: Capillary refill takes less than 2 seconds.   Neurological:      Mental Status: She is alert.   Psychiatric:         Mood and Affect: Mood normal.          Arti Almeida MD  [facility] " [facility]     n/a

## 2024-08-09 ENCOUNTER — HOSPITAL ENCOUNTER (OUTPATIENT)
Dept: BONE DENSITY | Facility: HOSPITAL | Age: 64
Discharge: HOME | End: 2024-08-09
Payer: MEDICARE

## 2024-08-09 VITALS — HEIGHT: 62 IN | WEIGHT: 239 LBS | BODY MASS INDEX: 43.98 KG/M2

## 2024-08-09 DIAGNOSIS — Z78.0 ASYMPTOMATIC MENOPAUSAL STATE: ICD-10-CM

## 2024-08-09 PROCEDURE — 77080 DXA BONE DENSITY AXIAL: CPT

## 2024-09-16 DIAGNOSIS — E03.9 HYPOTHYROIDISM, UNSPECIFIED TYPE: ICD-10-CM

## 2024-09-16 DIAGNOSIS — I10 ESSENTIAL HYPERTENSION: ICD-10-CM

## 2024-09-16 RX ORDER — TELMISARTAN 40 MG/1
40 TABLET ORAL DAILY
Qty: 30 TABLET | Refills: 0 | Status: SHIPPED | OUTPATIENT
Start: 2024-09-16

## 2024-09-16 RX ORDER — LEVOTHYROXINE SODIUM 100 UG/1
TABLET ORAL
Qty: 45 TABLET | Refills: 0 | Status: SHIPPED | OUTPATIENT
Start: 2024-09-16

## 2024-09-16 NOTE — TELEPHONE ENCOUNTER
Reason for call:   [x] Refill   [] Prior Auth  [] Other:     Office:   [x] PCP/Provider - [facility] - Arti Almeida MD   [] Specialty/Provider -     Medication:  levothyroxine 100 mcg tablet    Dose/Frequency: 1 tablet alternating with 1&1/2 tablets daily     Quantity: 120 tablet     Medication: telmisartan (MICARDIS) 40 mg tablet     Dose/Frequency: Take 1 tablet (40 mg total) by mouth daily     Quantity: 90 tablet     Pharmacy: [facility] - [address] [phone]    Does the patient have enough for 3 days?   [x] Yes   [] No - Send as HP to POD

## 2024-10-21 DIAGNOSIS — I10 ESSENTIAL HYPERTENSION: ICD-10-CM

## 2024-10-21 DIAGNOSIS — E03.9 HYPOTHYROIDISM, UNSPECIFIED TYPE: ICD-10-CM

## 2024-10-21 NOTE — TELEPHONE ENCOUNTER
Request a 90 day. Will be going away and will run out of medication when on vacation.       Reason for call:   [x] Refill   [] Prior Auth  [] Other:     Office:   [x] PCP/Provider - Arti Almeida/Carlton NEWMAN  [] Specialty/Provider -     Medication: Levothyroxine    Dose/Frequency: 100 mcg    Quantity: #110    Pharmacy: [facility]    Does the patient have enough for 3 days?   [x] Yes   [] No - Send as HP to POD                Reason for call:   [x] Refill   [] Prior Auth  [] Other:     Office:   [x] PCP/Provider -Arti Almeida/Carlton NEWMAN   [] Specialty/Provider -     Medication: Micardis    Dose/Frequency: 40 mg     Quantity: #90    Pharmacy: [facility]    Does the patient have enough for 3 days?   [x] Yes   [] No - Send as HP to POD

## 2024-10-23 RX ORDER — LEVOTHYROXINE SODIUM 100 UG/1
TABLET ORAL
Qty: 110 TABLET | Refills: 3 | Status: SHIPPED | OUTPATIENT
Start: 2024-10-23

## 2024-10-23 RX ORDER — TELMISARTAN 40 MG/1
40 TABLET ORAL DAILY
Qty: 90 TABLET | Refills: 3 | Status: SHIPPED | OUTPATIENT
Start: 2024-10-23

## 2025-03-21 ENCOUNTER — APPOINTMENT (OUTPATIENT)
Dept: LAB | Facility: MEDICAL CENTER | Age: 65
End: 2025-03-21
Payer: MEDICARE

## 2025-03-21 DIAGNOSIS — K75.81 NASH (NONALCOHOLIC STEATOHEPATITIS): Primary | ICD-10-CM

## 2025-03-21 LAB
ALBUMIN SERPL BCG-MCNC: 4.3 G/DL (ref 3.5–5)
ALP SERPL-CCNC: 94 U/L (ref 34–104)
ALT SERPL W P-5'-P-CCNC: 49 U/L (ref 7–52)
ANION GAP SERPL CALCULATED.3IONS-SCNC: 10 MMOL/L (ref 4–13)
AST SERPL W P-5'-P-CCNC: 34 U/L (ref 13–39)
BILIRUB SERPL-MCNC: 1.1 MG/DL (ref 0.2–1)
BUN SERPL-MCNC: 14 MG/DL (ref 5–25)
CALCIUM SERPL-MCNC: 9.5 MG/DL (ref 8.4–10.2)
CHLORIDE SERPL-SCNC: 105 MMOL/L (ref 96–108)
CO2 SERPL-SCNC: 26 MMOL/L (ref 21–32)
CREAT SERPL-MCNC: 0.64 MG/DL (ref 0.6–1.3)
ERYTHROCYTE [DISTWIDTH] IN BLOOD BY AUTOMATED COUNT: 12.9 % (ref 11.6–15.1)
GFR SERPL CREATININE-BSD FRML MDRD: 93 ML/MIN/1.73SQ M
GLUCOSE P FAST SERPL-MCNC: 89 MG/DL (ref 65–99)
HCT VFR BLD AUTO: 45.7 % (ref 34.8–46.1)
HGB BLD-MCNC: 14.4 G/DL (ref 11.5–15.4)
MCH RBC QN AUTO: 29 PG (ref 26.8–34.3)
MCHC RBC AUTO-ENTMCNC: 31.5 G/DL (ref 31.4–37.4)
MCV RBC AUTO: 92 FL (ref 82–98)
PLATELET # BLD AUTO: 277 THOUSANDS/UL (ref 149–390)
PMV BLD AUTO: 11.6 FL (ref 8.9–12.7)
POTASSIUM SERPL-SCNC: 3.7 MMOL/L (ref 3.5–5.3)
PROT SERPL-MCNC: 7.8 G/DL (ref 6.4–8.4)
RBC # BLD AUTO: 4.96 MILLION/UL (ref 3.81–5.12)
SODIUM SERPL-SCNC: 141 MMOL/L (ref 135–147)
WBC # BLD AUTO: 7.39 THOUSAND/UL (ref 4.31–10.16)

## 2025-03-21 PROCEDURE — 85027 COMPLETE CBC AUTOMATED: CPT

## 2025-03-21 PROCEDURE — 80053 COMPREHEN METABOLIC PANEL: CPT

## 2025-03-21 PROCEDURE — 36415 COLL VENOUS BLD VENIPUNCTURE: CPT

## 2025-04-14 ENCOUNTER — RA CDI HCC (OUTPATIENT)
Dept: OTHER | Facility: HOSPITAL | Age: 65
End: 2025-04-14

## 2025-04-14 NOTE — PROGRESS NOTES
E66.01   HCC coding opportunities          Chart Reviewed number of suggestions sent to Provider: 1     Patients Insurance     Medicare Insurance: Medicare

## 2025-04-15 ENCOUNTER — OFFICE VISIT (OUTPATIENT)
Dept: FAMILY MEDICINE CLINIC | Facility: CLINIC | Age: 65
End: 2025-04-15
Payer: MEDICARE

## 2025-04-15 VITALS
BODY MASS INDEX: 44.27 KG/M2 | HEART RATE: 74 BPM | HEIGHT: 62 IN | WEIGHT: 240.6 LBS | OXYGEN SATURATION: 98 % | TEMPERATURE: 97 F | SYSTOLIC BLOOD PRESSURE: 126 MMHG | DIASTOLIC BLOOD PRESSURE: 64 MMHG

## 2025-04-15 DIAGNOSIS — Z00.00 ENCOUNTER FOR MEDICAL EXAMINATION TO ESTABLISH CARE: ICD-10-CM

## 2025-04-15 DIAGNOSIS — I10 PRIMARY HYPERTENSION: Primary | ICD-10-CM

## 2025-04-15 PROCEDURE — 99213 OFFICE O/P EST LOW 20 MIN: CPT | Performed by: NURSE PRACTITIONER

## 2025-04-15 PROCEDURE — G2211 COMPLEX E/M VISIT ADD ON: HCPCS | Performed by: NURSE PRACTITIONER

## 2025-04-15 NOTE — PROGRESS NOTES
"Name: Margaret Moore      : 1960      MRN: [id number]  Encounter Provider: CLYDE Daley  Encounter Date: 4/15/2025   Encounter department: [facility]  :  Assessment & Plan  Encounter for medical examination to establish care              History of Present Illness   She is here for transfer of care.  She is formally a patient of Dr. Foy.  She offers no new complaints.  She follows GI for fatty liver.  Recent labs with just a slightly elevated bilirubin.  Also follows with GYN.  She is physically active so she does quite a lot of walking she does a lot of traveling.  She denies chest pain shortness of breath or syncope with exercise.  She does admit to some weight gain but states she still feels that she is healthy overall.  Due for welcome to Medicare well      Review of Systems   Constitutional:  Negative for chills and fever.   HENT:  Negative for ear pain and sore throat.    Eyes:  Negative for pain and visual disturbance.   Respiratory:  Negative for cough and shortness of breath.    Cardiovascular:  Negative for chest pain and palpitations.   Gastrointestinal:  Negative for abdominal pain and vomiting.   Genitourinary:  Negative for dysuria and hematuria.   Musculoskeletal:  Negative for arthralgias and back pain.   Skin:  Negative for color change and rash.   Neurological:  Negative for seizures and syncope.   All other systems reviewed and are negative.      Objective   /64 (BP Location: Left arm, Patient Position: Sitting, Cuff Size: Adult)   Pulse 74   Temp (!) 97 °F (36.1 °C) (Tympanic)   Ht 5' 2.25\" (1.581 m)   Wt 109 kg (240 lb 9.6 oz)   SpO2 98%   BMI 43.65 kg/m²      Physical Exam  Cardiovascular:      Rate and Rhythm: Normal rate and regular rhythm.   Pulmonary:      Effort: Pulmonary effort is normal.      Breath sounds: Normal breath sounds.   Neurological:      Mental Status: She is alert.   Psychiatric:         Mood and Affect: Mood normal. "

## 2025-05-20 ENCOUNTER — OFFICE VISIT (OUTPATIENT)
Dept: FAMILY MEDICINE CLINIC | Facility: CLINIC | Age: 65
End: 2025-05-20
Payer: MEDICARE

## 2025-05-20 ENCOUNTER — APPOINTMENT (OUTPATIENT)
Age: 65
End: 2025-05-20
Payer: MEDICARE

## 2025-05-20 VITALS
WEIGHT: 240.4 LBS | HEART RATE: 76 BPM | BODY MASS INDEX: 44.24 KG/M2 | OXYGEN SATURATION: 98 % | DIASTOLIC BLOOD PRESSURE: 94 MMHG | TEMPERATURE: 97.8 F | HEIGHT: 62 IN | SYSTOLIC BLOOD PRESSURE: 160 MMHG

## 2025-05-20 DIAGNOSIS — Z00.00 MEDICARE ANNUAL WELLNESS VISIT, INITIAL: Primary | ICD-10-CM

## 2025-05-20 DIAGNOSIS — E66.01 MORBID OBESITY (HCC): ICD-10-CM

## 2025-05-20 DIAGNOSIS — E03.9 ACQUIRED HYPOTHYROIDISM: ICD-10-CM

## 2025-05-20 DIAGNOSIS — E78.2 MIXED HYPERLIPIDEMIA: ICD-10-CM

## 2025-05-20 DIAGNOSIS — I10 PRIMARY HYPERTENSION: ICD-10-CM

## 2025-05-20 LAB
CHOLEST SERPL-MCNC: 170 MG/DL (ref ?–200)
HDLC SERPL-MCNC: 61 MG/DL
LDLC SERPL CALC-MCNC: 94 MG/DL (ref 0–100)
NONHDLC SERPL-MCNC: 109 MG/DL
T4 FREE SERPL-MCNC: 0.9 NG/DL (ref 0.61–1.12)
TRIGL SERPL-MCNC: 76 MG/DL (ref ?–150)
TSH SERPL DL<=0.05 MIU/L-ACNC: 1.48 UIU/ML (ref 0.45–4.5)

## 2025-05-20 PROCEDURE — 84439 ASSAY OF FREE THYROXINE: CPT

## 2025-05-20 PROCEDURE — 36415 COLL VENOUS BLD VENIPUNCTURE: CPT

## 2025-05-20 PROCEDURE — 84443 ASSAY THYROID STIM HORMONE: CPT

## 2025-05-20 PROCEDURE — G0402 INITIAL PREVENTIVE EXAM: HCPCS | Performed by: NURSE PRACTITIONER

## 2025-05-20 PROCEDURE — 80061 LIPID PANEL: CPT | Performed by: NURSE PRACTITIONER

## 2025-05-20 NOTE — ASSESSMENT & PLAN NOTE
Discussed calorie and carb reduction increase protein increase exercise movement of some sort to just improve her calorie burning.  Not interested in a referral to nutrition or weight management at this point

## 2025-05-20 NOTE — PROGRESS NOTES
Name: Margaret Moore      : 1960      MRN: [id number]  Encounter Provider: CLYDE Daley  Encounter Date: 2025   Encounter department: [facility]  :  Assessment & Plan  Medicare annual wellness visit, initial         Mixed hyperlipidemia    Orders:  •  Lipid panel; Future    Primary hypertension  Send me readings.  We did discuss weight loss.  Partially high today because she skipped her medicine.       Acquired hypothyroidism    Orders:  •  TSH + Free T4; Future    Morbid obesity (HCC)  Discussed calorie and carb reduction increase protein increase exercise movement of some sort to just improve her calorie burning.  Not interested in a referral to nutrition or weight management at this point              Depression Screening and Follow-up Plan: Patient was screened for depression during today's encounter. They screened negative with a PHQ-2 score of 0.        Preventive health issues were discussed with patient, and age appropriate screening tests were ordered as noted in patient's After Visit Summary. Personalized health advice and appropriate referrals for health education or preventive services given if needed, as noted in patient's After Visit Summary.    History of Present Illness     Patient is here for Medicare well.  Offers no complaints.       Patient Care Team:  CLYDE Daley as PCP - General (Family Medicine)    Review of Systems  Medical History Reviewed by provider this encounter:  Meds  Problems       Annual Wellness Visit Questionnaire   Margaret is here for her Welcome to Medicare visit.     Health Risk Assessment:   Patient rates overall health as excellent. Patient feels that their physical health rating is same. Patient is very satisfied with their life. Eyesight was rated as much worse. Hearing was rated as same. Patient feels that their emotional and mental health rating is same. Patients states they are never, rarely angry. Patient states they  are never, rarely unusually tired/fatigued. Pain experienced in the last 7 days has been some. Patient's pain rating has been 6/10. Patient states that she has experienced no weight loss or gain in last 6 months.     Depression Screening:   PHQ-2 Score: 0      Fall Risk Screening:   In the past year, patient has experienced: no history of falling in past year      Urinary Incontinence Screening:   Patient has not leaked urine accidently in the last six months.     Home Safety:  Patient does not have trouble with stairs inside or outside of their home. Patient has working smoke alarms and has no working carbon monoxide detector. Home safety hazards include: none.     Nutrition:   Current diet is Regular.     Medications:   Patient is currently taking over-the-counter supplements. OTC medications include: see medication list. Patient is able to manage medications.     Activities of Daily Living (ADLs)/Instrumental Activities of Daily Living (IADLs):   Walk and transfer into and out of bed and chair?: Yes  Dress and groom yourself?: Yes    Bathe or shower yourself?: Yes    Feed yourself? Yes  Do your laundry/housekeeping?: Yes  Manage your money, pay your bills and track your expenses?: Yes  Make your own meals?: Yes    Do your own shopping?: Yes    Durable Medical Equipment Suppliers  N/A    Previous Hospitalizations:   Any hospitalizations or ED visits within the last 12 months?: No      Advance Care Planning:   Living will: Yes    Durable POA for healthcare: Yes    Advanced directive: Yes    Advanced directive counseling given: Yes    ACP document given: Yes    Patient declined ACP directive: No    End of Life Decisions reviewed with patient: Yes    Provider agrees with end of life decisions: Yes      Cognitive Screening:   Provider or family/friend/caregiver concerned regarding cognition?: No    Preventive Screenings      Cardiovascular Screening:    General: Screening Current      Diabetes Screening:     General:  Screening Current      Colorectal Cancer Screening:     General: Screening Current      Breast Cancer Screening:     General: Screening Current      Cervical Cancer Screening:    General: Screening Not Indicated      Lung Cancer Screening:     General: Screening Not Indicated    Screening, Brief Intervention, and Referral to Treatment (SBIRT)     Screening  Typical number of drinks in a day: 2  Typical number of drinks in a week: 6  Interpretation: Low risk drinking behavior.    AUDIT-C Screenin) How often did you have a drink containing alcohol in the past year? 2 to 3 times a week  2) How many drinks did you have on a typical day when you were drinking in the past year? 1 to 2  3) How often did you have 6 or more drinks on one occasion in the past year? never    AUDIT-C Score: 3  Interpretation: Score 3-12 (female): POSITIVE screen for alcohol misuse    AUDIT Screenin) How often during the last year have you found that you were not able to stop drinking once you had started? 0 - never  5) How often during the last year have you failed to do what was normally expected from you because of drinking? 0 - never  6) How often during the last year have you needed a first drink in the morning to get yourself going after a heavy drinking session? 0 - never  7) How often during the last year have you had a feeling of guilt or remorse after drinking? 0 - never  8) How often during the last year have you been unable to remember what happened the night before because you had been drinking? 0 - never  9) Have you or someone else been injured as a result of your drinking? 0 - no  10) Has a relative or friend or a doctor or another health worker been concerned about your drinking or suggested you cut down? 0 - no    AUDIT Score: 3  Interpretation: Low risk alcohol consumption    Single Item Drug Screening:  How often have you used an illegal drug (including marijuana) or a prescription medication for non-medical reasons  "in the past year? never    Single Item Drug Screen Score: 0  Interpretation: Negative screen for possible drug use disorder    Other Counseling Topics:   Calcium and vitamin D intake and regular weightbearing exercise.     Social Drivers of Health     Food Insecurity: No Food Insecurity (5/17/2025)    Nursing - Inadequate Food Risk Classification    • Worried About Running Out of Food in the Last Year: Never true    • Ran Out of Food in the Last Year: Never true   Transportation Needs: No Transportation Needs (5/17/2025)    PRAPARE - Transportation    • Lack of Transportation (Medical): No    • Lack of Transportation (Non-Medical): No   Housing Stability: Low Risk  (5/17/2025)    Housing Stability Vital Sign    • Unable to Pay for Housing in the Last Year: No    • Number of Times Moved in the Last Year: 0    • Homeless in the Last Year: No   Utilities: Not At Risk (5/17/2025)    [facility] Utilities    • Threatened with loss of utilities: No     Vision Screening    Right eye Left eye Both eyes   Without correction      With correction 20/30 20/70 20/20       Objective   /94   Pulse 76   Temp 97.8 °F (36.6 °C) (Tympanic)   Ht 5' 2.25\" (1.581 m)   Wt 109 kg (240 lb 6.4 oz)   SpO2 98%   BMI 43.62 kg/m²     Physical Exam    "

## 2025-05-20 NOTE — PATIENT INSTRUCTIONS
Medicare Preventive Visit Patient Instructions  Thank you for completing your Welcome to Medicare Visit or Medicare Annual Wellness Visit today. Your next wellness visit will be due in one year (5/21/2026).  The screening/preventive services that you may require over the next 5-10 years are detailed below. Some tests may not apply to you based off risk factors and/or age. Screening tests ordered at today's visit but not completed yet may show as past due. Also, please note that scanned in results may not display below.  Preventive Screenings:  Service Recommendations Previous Testing/Comments   Colorectal Cancer Screening  * Colonoscopy    * Fecal Occult Blood Test (FOBT)/Fecal Immunochemical Test (FIT)  * Fecal DNA/Cologuard Test  * Flexible Sigmoidoscopy Age: 45-75 years old   Colonoscopy: every 10 years (may be performed more frequently if at higher risk)  OR  FOBT/FIT: every 1 year  OR  Cologuard: every 3 years  OR  Sigmoidoscopy: every 5 years  Screening may be recommended earlier than age 45 if at higher risk for colorectal cancer. Also, an individualized decision between you and your healthcare provider will decide whether screening between the ages of 76-85 would be appropriate. Colonoscopy: 08/18/2022  FOBT/FIT: Not on file  Cologuard: Not on file  Sigmoidoscopy: Not on file    Screening Current     Breast Cancer Screening Age: 40+ years old  Frequency: every 1-2 years  Not required if history of left and right mastectomy Mammogram: 05/20/2024    Screening Current   Cervical Cancer Screening Between the ages of 21-29, pap smear recommended once every 3 years.   Between the ages of 30-65, can perform pap smear with HPV co-testing every 5 years.   Recommendations may differ for women with a history of total hysterectomy, cervical cancer, or abnormal pap smears in past. Pap Smear: 11/07/2016    Screening Not Indicated   Hepatitis C Screening Once for adults born between 1945 and 1965  More frequently in  patients at high risk for Hepatitis C Hep C Antibody: Not on file        Diabetes Screening 1-2 times per year if you're at risk for diabetes or have pre-diabetes Fasting glucose: 89 mg/dL (3/21/2025)  A1C: 5.5 % (2/16/2022)  Screening Current   Cholesterol Screening Once every 5 years if you don't have a lipid disorder. May order more often based on risk factors. Lipid panel: 03/22/2023    Screening Current     Other Preventive Screenings Covered by Medicare:  Abdominal Aortic Aneurysm (AAA) Screening: covered once if your at risk. You're considered to be at risk if you have a family history of AAA.  Lung Cancer Screening: covers low dose CT scan once per year if you meet all of the following conditions: (1) Age 55-77; (2) No signs or symptoms of lung cancer; (3) Current smoker or have quit smoking within the last 15 years; (4) You have a tobacco smoking history of at least 20 pack years (packs per day multiplied by number of years you smoked); (5) You get a written order from a healthcare provider.  Glaucoma Screening: covered annually if you're considered high risk: (1) You have diabetes OR (2) Family history of glaucoma OR (3)  aged 50 and older OR (4)  American aged 65 and older  Osteoporosis Screening: covered every 2 years if you meet one of the following conditions: (1) You're estrogen deficient and at risk for osteoporosis based off medical history and other findings; (2) Have a vertebral abnormality; (3) On glucocorticoid therapy for more than 3 months; (4) Have primary hyperparathyroidism; (5) On osteoporosis medications and need to assess response to drug therapy.   Last bone density test (DXA Scan): 08/09/2024.  HIV Screening: covered annually if you're between the age of 15-65. Also covered annually if you are younger than 15 and older than 65 with risk factors for HIV infection. For pregnant patients, it is covered up to 3 times per pregnancy.    Immunizations:  Immunization  Recommendations   Influenza Vaccine Annual influenza vaccination during flu season is recommended for all persons aged >= 6 months who do not have contraindications   Pneumococcal Vaccine   * Pneumococcal conjugate vaccine = PCV13 (Prevnar 13), PCV15 (Vaxneuvance), PCV20 (Prevnar 20)  * Pneumococcal polysaccharide vaccine = PPSV23 (Pneumovax) Adults 19-63 yo with certain risk factors or if 65+ yo  If never received any pneumonia vaccine: recommend Prevnar 20 (PCV20)  Give PCV20 if previously received 1 dose of PCV13 or PPSV23   Hepatitis B Vaccine 3 dose series if at intermediate or high risk (ex: diabetes, end stage renal disease, liver disease)   Respiratory syncytial virus (RSV) Vaccine - COVERED BY MEDICARE PART D  * RSVPreF3 (Arexvy) CDC recommends that adults 60 years of age and older may receive a single dose of RSV vaccine using shared clinical decision-making (SCDM)   Tetanus (Td) Vaccine - COST NOT COVERED BY MEDICARE PART B Following completion of primary series, a booster dose should be given every 10 years to maintain immunity against tetanus. Td may also be given as tetanus wound prophylaxis.   Tdap Vaccine - COST NOT COVERED BY MEDICARE PART B Recommended at least once for all adults. For pregnant patients, recommended with each pregnancy.   Shingles Vaccine (Shingrix) - COST NOT COVERED BY MEDICARE PART B  2 shot series recommended in those 19 years and older who have or will have weakened immune systems or those 50 years and older     Health Maintenance Due:      Topic Date Due   • Hepatitis C Screening  Never done   • HIV Screening  Never done   • Breast Cancer Screening: Mammogram  05/20/2026   • Colorectal Cancer Screening  08/18/2027     Immunizations Due:      Topic Date Due   • COVID-19 Vaccine (1 - 2024-25 season) Never done     Advance Directives   What are advance directives?  Advance directives are legal documents that state your wishes and plans for medical care. These plans are made  ahead of time in case you lose your ability to make decisions for yourself. Advance directives can apply to any medical decision, such as the treatments you want, and if you want to donate organs.   What are the types of advance directives?  There are many types of advance directives, and each state has rules about how to use them. You may choose a combination of any of the following:  Living will:  This is a written record of the treatment you want. You can also choose which treatments you do not want, which to limit, and which to stop at a certain time. This includes surgery, medicine, IV fluid, and tube feedings.   Durable power of  for healthcare (DPAHC):  This is a written record that states who you want to make healthcare choices for you when you are unable to make them for yourself. This person, called a proxy, is usually a family member or a friend. You may choose more than 1 proxy.  Do not resuscitate (DNR) order:  A DNR order is used in case your heart stops beating or you stop breathing. It is a request not to have certain forms of treatment, such as CPR. A DNR order may be included in other types of advance directives.  Medical directive:  This covers the care that you want if you are in a coma, near death, or unable to make decisions for yourself. You can list the treatments you want for each condition. Treatment may include pain medicine, surgery, blood transfusions, dialysis, IV or tube feedings, and a ventilator (breathing machine).  Values history:  This document has questions about your views, beliefs, and how you feel and think about life. This information can help others choose the care that you would choose.  Why are advance directives important?  An advance directive helps you control your care. Although spoken wishes may be used, it is better to have your wishes written down. Spoken wishes can be misunderstood, or not followed. Treatments may be given even if you do not want them. An  advance directive may make it easier for your family to make difficult choices about your care.   Weight Management   Why it is important to manage your weight:  Being overweight increases your risk of health conditions such as heart disease, high blood pressure, type 2 diabetes, and certain types of cancer. It can also increase your risk for osteoarthritis, sleep apnea, and other respiratory problems. Aim for a slow, steady weight loss. Even a small amount of weight loss can lower your risk of health problems.  How to lose weight safely:  A safe and healthy way to lose weight is to eat fewer calories and get regular exercise. You can lose up about 1 pound a week by decreasing the number of calories you eat by 500 calories each day.   Healthy meal plan for weight management:  A healthy meal plan includes a variety of foods, contains fewer calories, and helps you stay healthy. A healthy meal plan includes the following:  Eat whole-grain foods more often.  A healthy meal plan should contain fiber. Fiber is the part of grains, fruits, and vegetables that is not broken down by your body. Whole-grain foods are healthy and provide extra fiber in your diet. Some examples of whole-grain foods are whole-wheat breads and pastas, oatmeal, brown rice, and bulgur.  Eat a variety of vegetables every day.  Include dark, leafy greens such as spinach, kale, ozzie greens, and mustard greens. Eat yellow and orange vegetables such as carrots, sweet potatoes, and winter squash.   Eat a variety of fruits every day.  Choose fresh or canned fruit (canned in its own juice or light syrup) instead of juice. Fruit juice has very little or no fiber.  Eat low-fat dairy foods.  Drink fat-free (skim) milk or 1% milk. Eat fat-free yogurt and low-fat cottage cheese. Try low-fat cheeses such as mozzarella and other reduced-fat cheeses.  Choose meat and other protein foods that are low in fat.  Choose beans or other legumes such as split peas or  "lentils. Choose fish, skinless poultry (chicken or turkey), or lean cuts of red meat (beef or pork). Before you cook meat or poultry, cut off any visible fat.   Use less fat and oil.  Try baking foods instead of frying them. Add less fat, such as margarine, sour cream, regular salad dressing and mayonnaise to foods. Eat fewer high-fat foods. Some examples of high-fat foods include french fries, doughnuts, ice cream, and cakes.  Eat fewer sweets.  Limit foods and drinks that are high in sugar. This includes candy, cookies, regular soda, and sweetened drinks.  Exercise:  Exercise at least 30 minutes per day on most days of the week. Some examples of exercise include walking, biking, dancing, and swimming. You can also fit in more physical activity by taking the stairs instead of the elevator or parking farther away from stores. Ask your healthcare provider about the best exercise plan for you.   Alcohol Use and Your Health    Drinking too much can harm your health.  Excessive alcohol use leads to about 88,000 death in the [place] each year, and shortens the life of those who diet by almost 30 years.  Further, excessive drinking cost the economy $249 billion in 2010.  Most excessive drinkers are not alcohol dependent.    Excessive alcohol use has immediate effects that increase the risk of many harmful health conditions.  These are most often the result of binge drinking.  Over time, excessive alcohol use can lead to the development of chronic diseases and other series health problems.    What is considered a \"drink\"?        Excessive alcohol use includes:  Binge Drinking: For women, 4 or more drinks consumed on one occasion. For men, 5 or more drinks consumed on one occasion.  Heavy Drinking: For women, 8 or more drinks per week. For men, 15 or more drinks per week  Any alcohol used by pregnant women  Any alcohol used by those under the age of 21 years    If you choose to drink, do so in moderation:  Do not " drink at all if you are under the age of 21, or if you are or may be pregnant, or have health problems that could be made worse by drinking.  For women, up to 1 drink per day  For men, up to 2 drinks a day    No one should begin drinking or drink more frequently based on potential health benefits    Short-Term Health Risks:  Injuries: motor vehicle crashes, falls, drownings, burns  Violence: homicide, suicide, sexual assault, intimate partner violence  Alcohol poisoning  Reproductive health: risky sexual behaviors, unintended prengnacy, sexually transmitted diseases, miscarriage, stillbirth, fetal alcohol syndrome    Long-Term Health Risks:  Chronic diseases: high blood pressure, heart disease, stroke, liver disease, digestive problems  Cancers: breast, mouth and throat, liver, colon  Learning and memory problems: dementia, poor school performance  Mental health: depression, anxiety, insomnia  Social problems: lost productivity, family problems, unemployment  Alcohol dependence    For support and more information:  Substance Abuse and Mental Health Services Administration  [address]  [place] [address]  Web Address: http://www.samhsa.gov    Alcoholics Anonymous        Web Address: http://www.aa.org    https://www.cdc.gov/alcohol/fact-sheets/alcohol-use.htm   © Copyright  Information is for End User's use only and may not be sold, redistributed or otherwise used for commercial purposes. All illustrations and images included in CareNotes® are the copyrighted property of [employer] or [employer]

## 2025-05-20 NOTE — ASSESSMENT & PLAN NOTE
Send me readings.  We did discuss weight loss.  Partially high today because she skipped her medicine.

## 2025-05-21 ENCOUNTER — RESULTS FOLLOW-UP (OUTPATIENT)
Dept: FAMILY MEDICINE CLINIC | Facility: CLINIC | Age: 65
End: 2025-05-21

## 2025-08-05 ENCOUNTER — CONSULT (OUTPATIENT)
Dept: FAMILY MEDICINE CLINIC | Facility: CLINIC | Age: 65
End: 2025-08-05
Payer: MEDICARE

## 2025-08-05 VITALS
DIASTOLIC BLOOD PRESSURE: 80 MMHG | SYSTOLIC BLOOD PRESSURE: 130 MMHG | RESPIRATION RATE: 12 BRPM | BODY MASS INDEX: 44.53 KG/M2 | WEIGHT: 242 LBS | HEIGHT: 62 IN | HEART RATE: 81 BPM | TEMPERATURE: 98.2 F | OXYGEN SATURATION: 99 %

## 2025-08-05 DIAGNOSIS — Z01.818 PREOPERATIVE CLEARANCE: Primary | ICD-10-CM

## 2025-08-05 DIAGNOSIS — H26.8 OTHER CATARACT OF LEFT EYE: ICD-10-CM

## 2025-08-05 PROCEDURE — 99213 OFFICE O/P EST LOW 20 MIN: CPT | Performed by: NURSE PRACTITIONER

## (undated) DEVICE — SYRINGE 10ML SLIP TIP LF

## (undated) DEVICE — STRL ALLENTOWN HYSTEROSCOPY PK: Brand: CARDINAL HEALTH

## (undated) DEVICE — STERILE 8 INCH PROCTO SWAB: Brand: CARDINAL HEALTH

## (undated) DEVICE — 2000CC GUARDIAN II: Brand: GUARDIAN

## (undated) DEVICE — GLOVE PI ULTRA TOUCH SZ.7.0

## (undated) DEVICE — PVC URETHRAL CATHETER: Brand: DOVER

## (undated) DEVICE — DRAPE EQUIPMENT RF WAND

## (undated) DEVICE — CYSTO TUBING SINGLE IRRIGATION

## (undated) DEVICE — PREMIUM DRY TRAY LF: Brand: MEDLINE INDUSTRIES, INC.

## (undated) DEVICE — SCD SEQUENTIAL COMPRESSION COMFORT SLEEVE MEDIUM KNEE LENGTH: Brand: KENDALL SCD

## (undated) DEVICE — BIOPSY PUNCH 4MM DISPOSABLE

## (undated) DEVICE — NEEDLE 22 G X 1 1/2 SAFETY

## (undated) DEVICE — UNDER BUTTOCKS DRAPE W/FLUID CONTROL POUCH: Brand: CONVERTORS

## (undated) DEVICE — GLOVE INDICATOR PI UNDERGLOVE SZ 7 BLUE

## (undated) DEVICE — TISSUE REMOVAL SYSTEM FLUID MANAGEMENT ACCESSORIES: Brand: SYMPHION

## (undated) DEVICE — IV EXTENSION TUBING 33 IN